# Patient Record
Sex: MALE | Race: WHITE | NOT HISPANIC OR LATINO | Employment: UNEMPLOYED | URBAN - METROPOLITAN AREA
[De-identification: names, ages, dates, MRNs, and addresses within clinical notes are randomized per-mention and may not be internally consistent; named-entity substitution may affect disease eponyms.]

---

## 2017-02-06 PROBLEM — E46 PROTEIN CALORIE MALNUTRITION (HCC): Chronic | Status: ACTIVE | Noted: 2017-02-06

## 2017-02-06 PROBLEM — R53.83 FATIGUE: Chronic | Status: ACTIVE | Noted: 2017-02-06

## 2017-02-06 PROBLEM — D64.9 ANEMIA: Status: ACTIVE | Noted: 2017-02-06

## 2017-02-06 PROBLEM — R00.0 TACHYCARDIA: Status: ACTIVE | Noted: 2017-02-06

## 2017-02-06 PROBLEM — R17 JAUNDICE: Status: ACTIVE | Noted: 2017-02-06

## 2017-02-06 PROBLEM — R74.01 TRANSAMINITIS: Status: ACTIVE | Noted: 2017-02-06

## 2017-02-06 PROBLEM — E11.9 TYPE 2 DIABETES MELLITUS (HCC): Status: ACTIVE | Noted: 2017-02-06

## 2017-02-06 PROBLEM — E83.42 HYPOMAGNESEMIA: Status: ACTIVE | Noted: 2017-02-06

## 2017-02-06 PROBLEM — D53.9 MACROCYTIC ANEMIA: Status: ACTIVE | Noted: 2017-02-06

## 2017-02-06 PROBLEM — E87.6 HYPOKALEMIA: Status: ACTIVE | Noted: 2017-02-06

## 2017-02-06 PROBLEM — E87.1 HYPONATREMIA: Status: ACTIVE | Noted: 2017-02-06

## 2017-02-06 PROBLEM — D50.9 IRON DEFICIENCY ANEMIA: Status: ACTIVE | Noted: 2017-02-06

## 2017-02-06 PROBLEM — F10.939 ALCOHOL WITHDRAWAL (HCC): Status: ACTIVE | Noted: 2017-02-06

## 2017-02-08 PROBLEM — D64.9 ANEMIA: Status: ACTIVE | Noted: 2017-02-08

## 2017-04-03 PROBLEM — T78.3XXA ANGIOEDEMA: Status: ACTIVE | Noted: 2017-04-03

## 2017-04-03 PROBLEM — F10.10 ALCOHOL ABUSE: Status: ACTIVE | Noted: 2017-04-03

## 2017-04-03 PROBLEM — K92.2 GIB (GASTROINTESTINAL BLEEDING): Status: ACTIVE | Noted: 2017-04-03

## 2017-04-03 PROBLEM — E87.20 LACTIC ACIDOSIS: Status: ACTIVE | Noted: 2017-04-03

## 2017-04-03 PROBLEM — E87.20 METABOLIC ACIDOSIS: Status: ACTIVE | Noted: 2017-04-03

## 2017-04-04 PROBLEM — R73.9 HYPERGLYCEMIA: Status: ACTIVE | Noted: 2017-04-04

## 2017-08-22 PROBLEM — I16.0 HYPERTENSIVE URGENCY: Status: ACTIVE | Noted: 2017-08-22

## 2018-10-16 PROBLEM — R74.8 ELEVATED LIPASE: Status: ACTIVE | Noted: 2017-02-06

## 2018-10-16 PROBLEM — E87.20 METABOLIC ACIDOSIS: Status: RESOLVED | Noted: 2017-04-03 | Resolved: 2018-10-16

## 2018-10-16 PROBLEM — T78.3XXA ANGIOEDEMA: Status: RESOLVED | Noted: 2017-04-03 | Resolved: 2018-10-16

## 2018-10-16 PROBLEM — E87.20 LACTIC ACIDOSIS: Status: RESOLVED | Noted: 2017-04-03 | Resolved: 2018-10-16

## 2018-10-16 PROBLEM — R53.83 FATIGUE: Chronic | Status: RESOLVED | Noted: 2017-02-06 | Resolved: 2018-10-16

## 2018-10-19 PROBLEM — R19.5 GUAIAC POSITIVE STOOLS: Status: ACTIVE | Noted: 2018-10-19

## 2018-10-30 PROBLEM — R00.0 TACHYCARDIA: Status: RESOLVED | Noted: 2017-02-06 | Resolved: 2018-10-30

## 2018-10-30 PROBLEM — E87.6 HYPOKALEMIA: Status: RESOLVED | Noted: 2017-02-06 | Resolved: 2018-10-30

## 2018-10-30 PROBLEM — R19.5 GUAIAC POSITIVE STOOLS: Status: RESOLVED | Noted: 2018-10-19 | Resolved: 2018-10-30

## 2018-10-30 PROBLEM — R74.8 ELEVATED LIPASE: Status: RESOLVED | Noted: 2017-02-06 | Resolved: 2018-10-30

## 2018-10-30 PROBLEM — R73.9 HYPERGLYCEMIA: Status: RESOLVED | Noted: 2017-04-04 | Resolved: 2018-10-30

## 2018-10-30 PROBLEM — F10.939 ALCOHOL WITHDRAWAL (HCC): Status: RESOLVED | Noted: 2017-02-06 | Resolved: 2018-10-30

## 2018-12-08 PROBLEM — R11.0 NAUSEA: Status: ACTIVE | Noted: 2018-12-08

## 2018-12-08 PROBLEM — L03.032 CELLULITIS OF GREAT TOE, LEFT: Status: ACTIVE | Noted: 2018-12-08

## 2018-12-08 PROBLEM — R10.9 ABDOMINAL PAIN: Status: ACTIVE | Noted: 2018-12-08

## 2018-12-08 PROBLEM — L03.032 PARONYCHIA OF GREAT TOE, LEFT: Status: ACTIVE | Noted: 2018-12-08

## 2018-12-08 PROBLEM — S92.424A NONDISP FX OF DISTAL PHALANX OF RIGHT GREAT TOE, INIT: Status: ACTIVE | Noted: 2018-12-08

## 2018-12-13 PROBLEM — S92.422K: Status: ACTIVE | Noted: 2018-12-13

## 2018-12-14 PROBLEM — R10.9 ABDOMINAL PAIN: Status: RESOLVED | Noted: 2018-12-08 | Resolved: 2018-12-14

## 2018-12-14 PROBLEM — R17 JAUNDICE: Status: RESOLVED | Noted: 2017-02-06 | Resolved: 2018-12-14

## 2018-12-14 PROBLEM — E83.42 HYPOMAGNESEMIA: Status: RESOLVED | Noted: 2017-02-06 | Resolved: 2018-12-14

## 2018-12-14 PROBLEM — R11.0 NAUSEA: Status: RESOLVED | Noted: 2018-12-08 | Resolved: 2018-12-14

## 2018-12-14 PROBLEM — K92.2 GIB (GASTROINTESTINAL BLEEDING): Status: RESOLVED | Noted: 2017-04-03 | Resolved: 2018-12-14

## 2018-12-14 PROBLEM — I16.0 HYPERTENSIVE URGENCY: Status: RESOLVED | Noted: 2017-08-22 | Resolved: 2018-12-14

## 2019-01-02 PROBLEM — M86.9 OSTEOMYELITIS OF TOE OF LEFT FOOT (HCC): Status: ACTIVE | Noted: 2019-01-02

## 2019-01-02 PROBLEM — L03.032 CELLULITIS OF GREAT TOE, LEFT: Chronic | Status: ACTIVE | Noted: 2018-12-08

## 2019-01-02 PROBLEM — S92.422K: Chronic | Status: ACTIVE | Noted: 2018-12-13

## 2019-01-02 PROBLEM — E11.9 TYPE 2 DIABETES MELLITUS (HCC): Chronic | Status: ACTIVE | Noted: 2017-02-06

## 2019-01-02 PROBLEM — L03.032 PARONYCHIA OF GREAT TOE OF LEFT FOOT: Chronic | Status: ACTIVE | Noted: 2018-12-08

## 2019-01-02 PROBLEM — S92.424A NONDISP FX OF DISTAL PHALANX OF RIGHT GREAT TOE, INIT: Chronic | Status: ACTIVE | Noted: 2018-12-08

## 2019-01-02 PROBLEM — E87.1 HYPONATREMIA: Chronic | Status: ACTIVE | Noted: 2017-02-06

## 2019-01-02 PROBLEM — D53.9 MACROCYTIC ANEMIA: Chronic | Status: ACTIVE | Noted: 2017-02-06

## 2019-01-03 PROBLEM — F10.11 ALCOHOL ABUSE, IN REMISSION: Status: ACTIVE | Noted: 2017-04-03

## 2019-01-17 PROBLEM — E10.42 DIABETIC POLYNEUROPATHY ASSOCIATED WITH TYPE 1 DIABETES MELLITUS (HCC): Status: ACTIVE | Noted: 2019-01-17

## 2019-04-08 PROBLEM — M79.675 TOE PAIN, LEFT: Status: ACTIVE | Noted: 2019-04-08

## 2019-04-08 PROBLEM — M79.674 TOE PAIN, RIGHT: Status: ACTIVE | Noted: 2019-04-08

## 2019-12-19 PROBLEM — R35.0 BENIGN PROSTATIC HYPERPLASIA WITH URINARY FREQUENCY: Status: ACTIVE | Noted: 2019-12-19

## 2019-12-19 PROBLEM — N40.1 BENIGN PROSTATIC HYPERPLASIA WITH URINARY FREQUENCY: Status: ACTIVE | Noted: 2019-12-19

## 2019-12-19 PROBLEM — R35.0 URINARY FREQUENCY: Status: ACTIVE | Noted: 2019-12-19

## 2020-04-06 PROBLEM — F41.0 PANIC ATTACK: Status: ACTIVE | Noted: 2020-04-06

## 2020-04-06 PROBLEM — R74.01 TRANSAMINITIS: Status: ACTIVE | Noted: 2020-04-06

## 2020-04-06 PROBLEM — B96.81 H PYLORI ULCER: Status: ACTIVE | Noted: 2020-04-06

## 2020-04-06 PROBLEM — G47.00 INSOMNIA: Status: ACTIVE | Noted: 2020-04-06

## 2020-04-06 PROBLEM — K27.9 H PYLORI ULCER: Status: ACTIVE | Noted: 2020-04-06

## 2020-04-06 PROBLEM — L03.032 CELLULITIS OF GREAT TOE, LEFT: Chronic | Status: RESOLVED | Noted: 2018-12-08 | Resolved: 2020-04-06

## 2020-04-06 PROBLEM — M79.674 TOE PAIN, RIGHT: Status: RESOLVED | Noted: 2019-04-08 | Resolved: 2020-04-06

## 2020-04-06 PROBLEM — S92.424A NONDISP FX OF DISTAL PHALANX OF RIGHT GREAT TOE, INIT: Chronic | Status: RESOLVED | Noted: 2018-12-08 | Resolved: 2020-04-06

## 2020-04-06 PROBLEM — M86.9 OSTEOMYELITIS OF TOE OF LEFT FOOT (HCC): Status: RESOLVED | Noted: 2019-01-02 | Resolved: 2020-04-06

## 2020-04-06 PROBLEM — M79.675 TOE PAIN, LEFT: Status: RESOLVED | Noted: 2019-04-08 | Resolved: 2020-04-06

## 2020-04-06 PROBLEM — L03.032 PARONYCHIA OF GREAT TOE OF LEFT FOOT: Chronic | Status: RESOLVED | Noted: 2018-12-08 | Resolved: 2020-04-06

## 2020-04-06 PROBLEM — E11.40 TYPE 2 DIABETES MELLITUS WITH DIABETIC NEUROPATHY, WITHOUT LONG-TERM CURRENT USE OF INSULIN (HCC): Status: ACTIVE | Noted: 2019-01-17

## 2020-04-06 PROBLEM — S92.422K: Chronic | Status: RESOLVED | Noted: 2018-12-13 | Resolved: 2020-04-06

## 2020-04-06 PROBLEM — E11.65 TYPE 2 DIABETES MELLITUS WITH HYPERGLYCEMIA, WITHOUT LONG-TERM CURRENT USE OF INSULIN (HCC): Status: ACTIVE | Noted: 2020-04-06

## 2020-04-09 PROBLEM — D53.9 MACROCYTIC ANEMIA: Chronic | Status: RESOLVED | Noted: 2017-02-06 | Resolved: 2020-04-09

## 2020-04-09 PROBLEM — R35.0 URINARY FREQUENCY: Status: RESOLVED | Noted: 2019-12-19 | Resolved: 2020-04-09

## 2020-04-09 PROBLEM — E11.9 TYPE 2 DIABETES MELLITUS (HCC): Chronic | Status: RESOLVED | Noted: 2017-02-06 | Resolved: 2020-04-09

## 2020-04-09 PROBLEM — D50.9 IRON DEFICIENCY ANEMIA: Status: RESOLVED | Noted: 2017-02-06 | Resolved: 2020-04-09

## 2020-04-09 PROBLEM — R41.82 ALTERED MENTAL STATE: Status: ACTIVE | Noted: 2020-04-09

## 2020-04-09 PROBLEM — E11.65 TYPE 2 DIABETES MELLITUS WITH HYPERGLYCEMIA, WITHOUT LONG-TERM CURRENT USE OF INSULIN (HCC): Status: RESOLVED | Noted: 2020-04-06 | Resolved: 2020-04-09

## 2020-04-10 PROBLEM — E87.1 HYPONATREMIA: Chronic | Status: RESOLVED | Noted: 2017-02-06 | Resolved: 2020-04-10

## 2020-04-10 PROBLEM — R19.7 DIARRHEA: Status: ACTIVE | Noted: 2020-04-10

## 2020-04-27 PROBLEM — I10 HYPERTENSION: Status: ACTIVE | Noted: 2020-04-27

## 2020-05-13 PROBLEM — R74.01 TRANSAMINITIS: Status: RESOLVED | Noted: 2020-04-06 | Resolved: 2020-05-13

## 2020-05-13 PROBLEM — F43.10 PTSD (POST-TRAUMATIC STRESS DISORDER): Chronic | Status: ACTIVE | Noted: 2020-05-13

## 2020-05-13 PROBLEM — R41.82 ALTERED MENTAL STATE: Status: RESOLVED | Noted: 2020-04-09 | Resolved: 2020-05-13

## 2020-05-24 PROBLEM — Z91.148 POOR COMPLIANCE WITH MEDICATION: Status: ACTIVE | Noted: 2020-05-24

## 2020-06-09 ENCOUNTER — APPOINTMENT (OUTPATIENT)
Dept: RADIOLOGY | Facility: MEDICAL CENTER | Age: 45
DRG: 963 | End: 2020-06-09
Attending: NEUROLOGICAL SURGERY
Payer: OTHER MISCELLANEOUS

## 2020-06-09 ENCOUNTER — HOSPITAL ENCOUNTER (OUTPATIENT)
Dept: RADIOLOGY | Facility: MEDICAL CENTER | Age: 45
End: 2020-06-09
Payer: COMMERCIAL

## 2020-06-09 ENCOUNTER — APPOINTMENT (OUTPATIENT)
Dept: RADIOLOGY | Facility: MEDICAL CENTER | Age: 45
DRG: 963 | End: 2020-06-09
Attending: EMERGENCY MEDICINE
Payer: OTHER MISCELLANEOUS

## 2020-06-09 ENCOUNTER — HOSPITAL ENCOUNTER (INPATIENT)
Facility: MEDICAL CENTER | Age: 45
LOS: 7 days | DRG: 963 | End: 2020-06-16
Attending: EMERGENCY MEDICINE | Admitting: SURGERY
Payer: OTHER MISCELLANEOUS

## 2020-06-09 DIAGNOSIS — F10.939 ALCOHOL WITHDRAWAL SYNDROME WITH COMPLICATION (HCC): ICD-10-CM

## 2020-06-09 DIAGNOSIS — R13.12 OROPHARYNGEAL DYSPHAGIA: ICD-10-CM

## 2020-06-09 DIAGNOSIS — S06.9X1A: ICD-10-CM

## 2020-06-09 DIAGNOSIS — M25.512 ACUTE PAIN OF LEFT SHOULDER: ICD-10-CM

## 2020-06-09 PROBLEM — F99 PSYCHIATRIC DISORDER: Status: ACTIVE | Noted: 2020-06-09

## 2020-06-09 PROBLEM — Z53.09 CONTRAINDICATION TO DEEP VEIN THROMBOSIS (DVT) PROPHYLAXIS: Status: ACTIVE | Noted: 2020-06-09

## 2020-06-09 PROBLEM — T14.90XA TRAUMA: Status: ACTIVE | Noted: 2020-06-09

## 2020-06-09 PROBLEM — S27.321A LEFT PULMONARY CONTUSION: Status: ACTIVE | Noted: 2020-06-09

## 2020-06-09 PROBLEM — E11.9 TYPE 2 DIABETES MELLITUS (HCC): Status: ACTIVE | Noted: 2020-06-09

## 2020-06-09 PROBLEM — J96.90 RESPIRATORY FAILURE FOLLOWING TRAUMA (HCC): Status: ACTIVE | Noted: 2020-06-09

## 2020-06-09 PROBLEM — S06.9XAA TRAUMATIC BRAIN INJURY, CLOSED (HCC): Status: ACTIVE | Noted: 2020-06-09

## 2020-06-09 PROBLEM — D50.9 IRON DEFICIENCY ANEMIA: Status: ACTIVE | Noted: 2020-06-09

## 2020-06-09 PROBLEM — Z11.9 SCREENING EXAMINATION FOR INFECTIOUS DISEASE: Status: ACTIVE | Noted: 2020-06-09

## 2020-06-09 LAB
ABO + RH BLD: NORMAL
ABO GROUP BLD: NORMAL
ACTION RANGE TRIGGERED IACRT: YES
ALBUMIN SERPL BCP-MCNC: 3.6 G/DL (ref 3.2–4.9)
ALBUMIN/GLOB SERPL: 1.1 G/DL
ALP SERPL-CCNC: 149 U/L (ref 30–99)
ALT SERPL-CCNC: 36 U/L (ref 2–50)
AMMONIA PLAS-SCNC: 24 UMOL/L (ref 11–45)
ANION GAP SERPL CALC-SCNC: 11 MMOL/L (ref 7–16)
ANISOCYTOSIS BLD QL SMEAR: ABNORMAL
APTT PPP: 29.5 SEC (ref 24.7–36)
AST SERPL-CCNC: 42 U/L (ref 12–45)
BASE EXCESS BLDA CALC-SCNC: -8 MMOL/L (ref -4–3)
BASOPHILS # BLD AUTO: 0 % (ref 0–1.8)
BASOPHILS # BLD: 0 K/UL (ref 0–0.12)
BILIRUB SERPL-MCNC: 0.2 MG/DL (ref 0.1–1.5)
BLD GP AB SCN SERPL QL: NORMAL
BODY TEMPERATURE: ABNORMAL DEGREES
BUN SERPL-MCNC: 23 MG/DL (ref 8–22)
CALCIUM SERPL-MCNC: 8.6 MG/DL (ref 8.5–10.5)
CFT BLD TEG: 4.3 MIN (ref 5–10)
CHLORIDE SERPL-SCNC: 103 MMOL/L (ref 96–112)
CLOT ANGLE BLD TEG: 66.3 DEGREES (ref 53–72)
CLOT LYSIS 30M P MA LENFR BLD TEG: 0.1 % (ref 0–8)
CO2 BLDA-SCNC: 20 MMOL/L (ref 20–33)
CO2 SERPL-SCNC: 18 MMOL/L (ref 20–33)
COVID ORDER STATUS COVID19: NORMAL
CREAT SERPL-MCNC: 0.87 MG/DL (ref 0.5–1.4)
CT.EXTRINSIC BLD ROTEM: 1.8 MIN (ref 1–3)
EOSINOPHIL # BLD AUTO: 0.44 K/UL (ref 0–0.51)
EOSINOPHIL NFR BLD: 7.8 % (ref 0–6.9)
ERYTHROCYTE [DISTWIDTH] IN BLOOD BY AUTOMATED COUNT: 63.3 FL (ref 35.9–50)
ERYTHROCYTE [DISTWIDTH] IN BLOOD BY AUTOMATED COUNT: 68.9 FL (ref 35.9–50)
ETHANOL BLD-MCNC: <10.1 MG/DL (ref 0–10.1)
GLOBULIN SER CALC-MCNC: 3.4 G/DL (ref 1.9–3.5)
GLUCOSE BLD-MCNC: 153 MG/DL (ref 65–99)
GLUCOSE SERPL-MCNC: 166 MG/DL (ref 65–99)
HCO3 BLDA-SCNC: 18.5 MMOL/L (ref 17–25)
HCT VFR BLD AUTO: 24.3 % (ref 42–52)
HCT VFR BLD AUTO: 27.2 % (ref 42–52)
HGB BLD-MCNC: 8 G/DL (ref 14–18)
HGB BLD-MCNC: 9.4 G/DL (ref 14–18)
HOROWITZ INDEX BLDA+IHG-RTO: 105 MM[HG]
INR PPP: 1.1 (ref 0.87–1.13)
INST. QUALIFIED PATIENT IIQPT: YES
LYMPHOCYTES # BLD AUTO: 1.9 K/UL (ref 1–4.8)
LYMPHOCYTES NFR BLD: 33.9 % (ref 22–41)
MACROCYTES BLD QL SMEAR: ABNORMAL
MANUAL DIFF BLD: NORMAL
MCF BLD TEG: 58.1 MM (ref 50–70)
MCH RBC QN AUTO: 33.7 PG (ref 27–33)
MCH RBC QN AUTO: 34.2 PG (ref 27–33)
MCHC RBC AUTO-ENTMCNC: 32.9 G/DL (ref 33.7–35.3)
MCHC RBC AUTO-ENTMCNC: 34.6 G/DL (ref 33.7–35.3)
MCV RBC AUTO: 103.8 FL (ref 81.4–97.8)
MCV RBC AUTO: 97.5 FL (ref 81.4–97.8)
MONOCYTES # BLD AUTO: 0.15 K/UL (ref 0–0.85)
MONOCYTES NFR BLD AUTO: 2.6 % (ref 0–13.4)
MORPHOLOGY BLD-IMP: NORMAL
NEUTROPHILS # BLD AUTO: 3.12 K/UL (ref 1.82–7.42)
NEUTROPHILS NFR BLD: 55.7 % (ref 44–72)
NRBC # BLD AUTO: 0 K/UL
NRBC BLD-RTO: 0 /100 WBC
O2/TOTAL GAS SETTING VFR VENT: 100 %
OVALOCYTES BLD QL SMEAR: NORMAL
PA AA BLD-ACNC: 0 %
PA ADP BLD-ACNC: 6.7 %
PCO2 BLDA: 40.2 MMHG (ref 26–37)
PCO2 TEMP ADJ BLDA: 38 MMHG (ref 26–37)
PH BLDA: 7.27 [PH] (ref 7.4–7.5)
PH TEMP ADJ BLDA: 7.29 [PH] (ref 7.4–7.5)
PLATELET # BLD AUTO: 129 K/UL (ref 164–446)
PLATELET # BLD AUTO: 141 K/UL (ref 164–446)
PLATELET BLD QL SMEAR: NORMAL
PMV BLD AUTO: 9.4 FL (ref 9–12.9)
PMV BLD AUTO: 9.9 FL (ref 9–12.9)
PO2 BLDA: 105 MMHG (ref 64–87)
PO2 TEMP ADJ BLDA: 98 MMHG (ref 64–87)
POTASSIUM SERPL-SCNC: 4.1 MMOL/L (ref 3.6–5.5)
PROT SERPL-MCNC: 7 G/DL (ref 6–8.2)
PROTHROMBIN TIME: 14.5 SEC (ref 12–14.6)
RBC # BLD AUTO: 2.34 M/UL (ref 4.7–6.1)
RBC # BLD AUTO: 2.79 M/UL (ref 4.7–6.1)
RBC BLD AUTO: PRESENT
RH BLD: NORMAL
SAO2 % BLDA: 97 % (ref 93–99)
SARS-COV-2 RNA RESP QL NAA+PROBE: NOTDETECTED
SODIUM SERPL-SCNC: 132 MMOL/L (ref 135–145)
SPECIMEN DRAWN FROM PATIENT: ABNORMAL
SPECIMEN SOURCE: NORMAL
TEG ALGORITHM TGALG: ABNORMAL
TRIGL SERPL-MCNC: 70 MG/DL (ref 0–149)
WBC # BLD AUTO: 5.5 K/UL (ref 4.8–10.8)
WBC # BLD AUTO: 5.6 K/UL (ref 4.8–10.8)

## 2020-06-09 PROCEDURE — 85576 BLOOD PLATELET AGGREGATION: CPT | Mod: 91

## 2020-06-09 PROCEDURE — 85007 BL SMEAR W/DIFF WBC COUNT: CPT

## 2020-06-09 PROCEDURE — 86850 RBC ANTIBODY SCREEN: CPT

## 2020-06-09 PROCEDURE — 86900 BLOOD TYPING SEROLOGIC ABO: CPT

## 2020-06-09 PROCEDURE — 700111 HCHG RX REV CODE 636 W/ 250 OVERRIDE (IP): Performed by: NURSE PRACTITIONER

## 2020-06-09 PROCEDURE — G0390 TRAUMA RESPONS W/HOSP CRITI: HCPCS

## 2020-06-09 PROCEDURE — 94002 VENT MGMT INPAT INIT DAY: CPT

## 2020-06-09 PROCEDURE — 70450 CT HEAD/BRAIN W/O DYE: CPT

## 2020-06-09 PROCEDURE — 93010 ELECTROCARDIOGRAM REPORT: CPT | Performed by: INTERNAL MEDICINE

## 2020-06-09 PROCEDURE — 85384 FIBRINOGEN ACTIVITY: CPT

## 2020-06-09 PROCEDURE — 71045 X-RAY EXAM CHEST 1 VIEW: CPT

## 2020-06-09 PROCEDURE — 96365 THER/PROPH/DIAG IV INF INIT: CPT

## 2020-06-09 PROCEDURE — 96376 TX/PRO/DX INJ SAME DRUG ADON: CPT

## 2020-06-09 PROCEDURE — C9803 HOPD COVID-19 SPEC COLLECT: HCPCS | Performed by: NURSE PRACTITIONER

## 2020-06-09 PROCEDURE — 31500 INSERT EMERGENCY AIRWAY: CPT

## 2020-06-09 PROCEDURE — 700102 HCHG RX REV CODE 250 W/ 637 OVERRIDE(OP): Performed by: NURSE PRACTITIONER

## 2020-06-09 PROCEDURE — 36600 WITHDRAWAL OF ARTERIAL BLOOD: CPT

## 2020-06-09 PROCEDURE — 85027 COMPLETE CBC AUTOMATED: CPT | Mod: 91

## 2020-06-09 PROCEDURE — 82803 BLOOD GASES ANY COMBINATION: CPT

## 2020-06-09 PROCEDURE — 96375 TX/PRO/DX INJ NEW DRUG ADDON: CPT

## 2020-06-09 PROCEDURE — 700111 HCHG RX REV CODE 636 W/ 250 OVERRIDE (IP): Performed by: EMERGENCY MEDICINE

## 2020-06-09 PROCEDURE — 99291 CRITICAL CARE FIRST HOUR: CPT

## 2020-06-09 PROCEDURE — 700101 HCHG RX REV CODE 250: Performed by: EMERGENCY MEDICINE

## 2020-06-09 PROCEDURE — 80053 COMPREHEN METABOLIC PANEL: CPT

## 2020-06-09 PROCEDURE — 86901 BLOOD TYPING SEROLOGIC RH(D): CPT

## 2020-06-09 PROCEDURE — 82962 GLUCOSE BLOOD TEST: CPT

## 2020-06-09 PROCEDURE — 5A1935Z RESPIRATORY VENTILATION, LESS THAN 24 CONSECUTIVE HOURS: ICD-10-PCS | Performed by: EMERGENCY MEDICINE

## 2020-06-09 PROCEDURE — 80307 DRUG TEST PRSMV CHEM ANLYZR: CPT

## 2020-06-09 PROCEDURE — 302214 INTUBATION BOX: Performed by: EMERGENCY MEDICINE

## 2020-06-09 PROCEDURE — 770022 HCHG ROOM/CARE - ICU (200)

## 2020-06-09 PROCEDURE — 84478 ASSAY OF TRIGLYCERIDES: CPT

## 2020-06-09 PROCEDURE — 0BH18EZ INSERTION OF ENDOTRACHEAL AIRWAY INTO TRACHEA, VIA NATURAL OR ARTIFICIAL OPENING ENDOSCOPIC: ICD-10-PCS | Performed by: EMERGENCY MEDICINE

## 2020-06-09 PROCEDURE — 93005 ELECTROCARDIOGRAM TRACING: CPT | Performed by: NURSE PRACTITIONER

## 2020-06-09 PROCEDURE — 85730 THROMBOPLASTIN TIME PARTIAL: CPT

## 2020-06-09 PROCEDURE — 85347 COAGULATION TIME ACTIVATED: CPT

## 2020-06-09 PROCEDURE — 72128 CT CHEST SPINE W/O DYE: CPT

## 2020-06-09 PROCEDURE — 700105 HCHG RX REV CODE 258: Performed by: NURSE PRACTITIONER

## 2020-06-09 PROCEDURE — U0004 COV-19 TEST NON-CDC HGH THRU: HCPCS

## 2020-06-09 PROCEDURE — 82140 ASSAY OF AMMONIA: CPT

## 2020-06-09 PROCEDURE — 72131 CT LUMBAR SPINE W/O DYE: CPT

## 2020-06-09 PROCEDURE — 85610 PROTHROMBIN TIME: CPT

## 2020-06-09 RX ORDER — HALOPERIDOL 5 MG/ML
INJECTION INTRAMUSCULAR
Status: DISPENSED
Start: 2020-06-09 | End: 2020-06-10

## 2020-06-09 RX ORDER — DEXTROSE MONOHYDRATE 25 G/50ML
50 INJECTION, SOLUTION INTRAVENOUS
Status: DISCONTINUED | OUTPATIENT
Start: 2020-06-09 | End: 2020-06-13

## 2020-06-09 RX ORDER — HALOPERIDOL 5 MG/ML
INJECTION INTRAMUSCULAR
Status: COMPLETED | OUTPATIENT
Start: 2020-06-09 | End: 2020-06-09

## 2020-06-09 RX ORDER — ENEMA 19; 7 G/133ML; G/133ML
1 ENEMA RECTAL
Status: DISCONTINUED | OUTPATIENT
Start: 2020-06-09 | End: 2020-06-16 | Stop reason: HOSPADM

## 2020-06-09 RX ORDER — DOCUSATE SODIUM 100 MG/1
100 CAPSULE, LIQUID FILLED ORAL 2 TIMES DAILY
Status: DISCONTINUED | OUTPATIENT
Start: 2020-06-09 | End: 2020-06-16 | Stop reason: HOSPADM

## 2020-06-09 RX ORDER — ETOMIDATE 2 MG/ML
20 INJECTION INTRAVENOUS ONCE
Status: COMPLETED | OUTPATIENT
Start: 2020-06-09 | End: 2020-06-09

## 2020-06-09 RX ORDER — MIDAZOLAM HYDROCHLORIDE 1 MG/ML
1-5 INJECTION INTRAMUSCULAR; INTRAVENOUS
Status: DISCONTINUED | OUTPATIENT
Start: 2020-06-09 | End: 2020-06-11

## 2020-06-09 RX ORDER — ROCURONIUM BROMIDE 10 MG/ML
100 INJECTION, SOLUTION INTRAVENOUS ONCE
Status: COMPLETED | OUTPATIENT
Start: 2020-06-09 | End: 2020-06-09

## 2020-06-09 RX ORDER — LORAZEPAM 2 MG/ML
1 INJECTION INTRAMUSCULAR ONCE
Status: COMPLETED | OUTPATIENT
Start: 2020-06-09 | End: 2020-06-09

## 2020-06-09 RX ORDER — BISACODYL 10 MG
10 SUPPOSITORY, RECTAL RECTAL
Status: DISCONTINUED | OUTPATIENT
Start: 2020-06-09 | End: 2020-06-16 | Stop reason: HOSPADM

## 2020-06-09 RX ORDER — POLYETHYLENE GLYCOL 3350 17 G/17G
1 POWDER, FOR SOLUTION ORAL 2 TIMES DAILY
Status: DISCONTINUED | OUTPATIENT
Start: 2020-06-09 | End: 2020-06-16 | Stop reason: HOSPADM

## 2020-06-09 RX ORDER — LORAZEPAM 2 MG/ML
INJECTION INTRAMUSCULAR
Status: COMPLETED | OUTPATIENT
Start: 2020-06-09 | End: 2020-06-09

## 2020-06-09 RX ORDER — ONDANSETRON 2 MG/ML
4 INJECTION INTRAMUSCULAR; INTRAVENOUS EVERY 4 HOURS PRN
Status: DISCONTINUED | OUTPATIENT
Start: 2020-06-09 | End: 2020-06-16 | Stop reason: HOSPADM

## 2020-06-09 RX ORDER — FAMOTIDINE 20 MG/1
20 TABLET, FILM COATED ORAL 2 TIMES DAILY
Status: DISCONTINUED | OUTPATIENT
Start: 2020-06-09 | End: 2020-06-10

## 2020-06-09 RX ORDER — AMOXICILLIN 250 MG
1 CAPSULE ORAL NIGHTLY
Status: DISCONTINUED | OUTPATIENT
Start: 2020-06-09 | End: 2020-06-12

## 2020-06-09 RX ORDER — SODIUM CHLORIDE 9 MG/ML
INJECTION, SOLUTION INTRAVENOUS CONTINUOUS
Status: DISCONTINUED | OUTPATIENT
Start: 2020-06-09 | End: 2020-06-13

## 2020-06-09 RX ORDER — HALOPERIDOL 5 MG/ML
5 INJECTION INTRAMUSCULAR ONCE
Status: COMPLETED | OUTPATIENT
Start: 2020-06-09 | End: 2020-06-09

## 2020-06-09 RX ORDER — AMOXICILLIN 250 MG
1 CAPSULE ORAL
Status: DISCONTINUED | OUTPATIENT
Start: 2020-06-09 | End: 2020-06-16 | Stop reason: HOSPADM

## 2020-06-09 RX ADMIN — ROCURONIUM BROMIDE 100 MG: 10 INJECTION, SOLUTION INTRAVENOUS at 14:45

## 2020-06-09 RX ADMIN — SODIUM CHLORIDE: 9 INJECTION, SOLUTION INTRAVENOUS at 15:40

## 2020-06-09 RX ADMIN — HALOPERIDOL LACTATE 5 MG: 5 INJECTION, SOLUTION INTRAMUSCULAR at 15:30

## 2020-06-09 RX ADMIN — PROPOFOL 40 MCG/KG/MIN: 10 INJECTION, EMULSION INTRAVENOUS at 20:45

## 2020-06-09 RX ADMIN — LORAZEPAM 1 MG: 2 INJECTION INTRAMUSCULAR; INTRAVENOUS at 15:30

## 2020-06-09 RX ADMIN — FAMOTIDINE 20 MG: 10 INJECTION, SOLUTION INTRAVENOUS at 18:41

## 2020-06-09 RX ADMIN — LEVETIRACETAM 500 MG: 100 INJECTION, SOLUTION INTRAVENOUS at 18:12

## 2020-06-09 RX ADMIN — PROPOFOL 5 MCG/KG/MIN: 10 INJECTION, EMULSION INTRAVENOUS at 14:50

## 2020-06-09 RX ADMIN — ETOMIDATE 20 MG: 2 INJECTION INTRAVENOUS at 14:45

## 2020-06-09 RX ADMIN — INSULIN HUMAN 1 UNITS: 100 INJECTION, SOLUTION PARENTERAL at 18:43

## 2020-06-09 RX ADMIN — HALOPERIDOL LACTATE 5 MG: 5 INJECTION, SOLUTION INTRAMUSCULAR at 14:15

## 2020-06-09 RX ADMIN — LORAZEPAM 2 MG: 2 INJECTION INTRAMUSCULAR; INTRAVENOUS at 14:11

## 2020-06-09 ASSESSMENT — FIBROSIS 4 INDEX
FIB4 SCORE: 2.44
FIB4 SCORE: 1.35
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
## 2020-06-09 NOTE — DISCHARGE PLANNING
Medical Social Work    LSW received call from pt's mom, Coleen ([phone]), who was asking for a medical update on her son. LSW provided support to mom. LSW took mom's information and LSW let mom know that her information would be given to the nurse. Mom agreeable.     LSW contacted ICU and left message for bedside RN to call pt's mom with a medical update when available to do so.

## 2020-06-09 NOTE — ASSESSMENT & PLAN NOTE
CT chest with ill-defined focal density measuring approximately 3 cm in diameter involving the lateral aspect of the left midlung involving the upper lobe. In the setting of trauma, this finding is suggestive of a lung contusion.  Previous admission CXR imaging on 6/7 with an ill-defined 15 mm nodular density lateral left midlung.  Aggressive pulmonary hygiene.  Outpatient follow up.
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
## 2020-06-09 NOTE — PROGRESS NOTES
"This RN took over patient care at 1600. Report received from Charge RN Poonam .    Pt on vent by ETT set at APV/CMV set at 30/450/8/100%.   OG tube secured at 25cm and set to low continuous suction with 100cc of brownish pink output.     Pupils PERRL 2, sluuggish.    +2 Pulses on BUE; +1 BLE    Lung sounds are clear over dim.     BS normoactive X 4.     NGUYEN 5/5 strength.     Bilateral wrist restraints in place.     20g left AC running prop at 60mcg/kg/imn  18g right FA running NS @ 125mL/hr.     100cc of clear yellow urine at 1600.     Pt tachycardic at this time.    /86   Pulse (!) 141   Temp 36.3 °C (97.3 °F) (Temporal)   Resp (!) 25   Ht 1.803 m (5' 11\")   Wt 81.3 kg (179 lb 3.7 oz)   SpO2 100%   BMI 25.00 kg/m²     "

## 2020-06-09 NOTE — ASSESSMENT & PLAN NOTE
Admission SARS-CoV-2 PCR testing negative. LOW RISK patient. Repeat SARS-CoV-2 testing not indicated. Isolation precautions de-escalated.

## 2020-06-09 NOTE — ASSESSMENT & PLAN NOTE
Referring facility imaging with right posterior parietal lobe subarachnoid hemorrhage. Subdural hematoma along the posterior falx, left aspect measuring 3mm.  Repeat head CT stable.  Non-operative management.  Post traumatic pharmacologic seizure prophylaxis for 1 week completed 6/16.  6/15 SLP for cognitive evaluation completed. Patient appears at baseline and does not appear to require any further acute or post-acute SLP services for cognition.  Ki Allen MD. Neurosurgeon. [facility].

## 2020-06-09 NOTE — PROGRESS NOTES
Pt arrived to [facility]:    Skin check with mariana RN:   L forehead/eye abrasion  L upper/posterior shoulder bruising and abrasions    See flowsheet for vitals.
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
## 2020-06-09 NOTE — ASSESSMENT & PLAN NOTE
Initial systemic anticoagulation contraindicated secondary to elevated bleeding risk.  RAP score 5.  6/11 Trauma screening bilateral lower extremity venous duplex negative for above knee DVT.  6/13 Chemical DVT prophylaxis (Lovenox) initiated.  Ambulate TID.

## 2020-06-09 NOTE — FLOWSHEET NOTE
06/09/20 1501   Events/Summary/Plan   Events/Summary/Plan Pt intubated with an 8.0 @ 25 b/l equal breath sounds color change fog in tubing. please see flowsheet for vent settings   Vital Signs   Pulse (!) 141   Respiration (!) 25   Pulse Oximetry 100 %   CO2 Monitoring   ETCO2 (mmHg) 0   Chest Exam   Work Of Breathing / Effort Vented   Breath Sounds   RUL Breath Sounds Clear   RML Breath Sounds Clear   RLL Breath Sounds Clear   JOSHUA Breath Sounds Clear   LLL Breath Sounds Clear   Airways   Airway LDA Airway   Airway Oral 8.0   Placement Date/Time: 06/09/20 1526   Airway Placement: Central  Style: Cuffed  Airway Location: Oral  Airway Size: 8.0   Site Assessment Intact   Airway Tube Secured [employer];Commercial securing device   Date Securing Device changed? 06/09/20   Date Securing Device to be changed (Q 7 days) 06/16/20   Secured At  (cm) 25   Difficult Intubation? No   Cuffless No   Suctioning Device Inline Catheter Replaced   Next Closed Suction Device Change Due  06/16/20   Oxygen   FiO2% 100 %   O2 Delivery Device Ventilator

## 2020-06-09 NOTE — ED NOTES
Med rec updated and complete. Allergies reviewed  Pt is unable to participate in an interview at this time.  Placed call to pts home pharmacy ([facility]) to confirm last filled and picked up. Some of the   Medications listed were recent new starts.    [facility]

## 2020-06-09 NOTE — ED NOTES
LE: 1430 while in CT pt continued to increase agitation, pt was given additional medication with no affect, pt brought back to ED trauma bay for intubation. Intubation successful, all staff members with PPE, tube placement confirmed at bs pt to CT where exam was completed. Pt has been tachycardiac during duration in ED he had an hypotensive episode that resolved and BP has been up. Pt to ICU report to receiving RN pt belongings with pt in room.

## 2020-06-09 NOTE — ASSESSMENT & PLAN NOTE
Chronic condition treated with Glucotrol and Glucophage XL.  Holding maintenance metformin for 48 hours following intravenous contrast administration.  Insulin sliding scale coverage.   6/13 Resumed maintenance medication.

## 2020-06-09 NOTE — ASSESSMENT & PLAN NOTE
Chart review with history of iron deficient anemia.  6/10 Iron studies completed and marginally low.  6/13 Oral iron initiate.  Transfuse 1 unit PRBC's for hemoglobin less than 7.

## 2020-06-10 ENCOUNTER — APPOINTMENT (OUTPATIENT)
Dept: RADIOLOGY | Facility: MEDICAL CENTER | Age: 45
DRG: 963 | End: 2020-06-10
Attending: NURSE PRACTITIONER
Payer: OTHER MISCELLANEOUS

## 2020-06-10 LAB
ALBUMIN SERPL BCP-MCNC: 3.3 G/DL (ref 3.2–4.9)
ALBUMIN/GLOB SERPL: 1 G/DL
ALP SERPL-CCNC: 113 U/L (ref 30–99)
ALT SERPL-CCNC: 35 U/L (ref 2–50)
ANION GAP SERPL CALC-SCNC: 15 MMOL/L (ref 7–16)
ANION GAP SERPL CALC-SCNC: 9 MMOL/L (ref 7–16)
AST SERPL-CCNC: 32 U/L (ref 12–45)
BILIRUB SERPL-MCNC: 0.4 MG/DL (ref 0.1–1.5)
BUN SERPL-MCNC: 11 MG/DL (ref 8–22)
BUN SERPL-MCNC: 9 MG/DL (ref 8–22)
CALCIUM SERPL-MCNC: 7.7 MG/DL (ref 8.5–10.5)
CALCIUM SERPL-MCNC: 8.4 MG/DL (ref 8.5–10.5)
CHLORIDE SERPL-SCNC: 102 MMOL/L (ref 96–112)
CHLORIDE SERPL-SCNC: 104 MMOL/L (ref 96–112)
CO2 SERPL-SCNC: 17 MMOL/L (ref 20–33)
CO2 SERPL-SCNC: 19 MMOL/L (ref 20–33)
CREAT SERPL-MCNC: 0.66 MG/DL (ref 0.5–1.4)
CREAT SERPL-MCNC: 0.7 MG/DL (ref 0.5–1.4)
EKG IMPRESSION: NORMAL
ERYTHROCYTE [DISTWIDTH] IN BLOOD BY AUTOMATED COUNT: 64.4 FL (ref 35.9–50)
GLOBULIN SER CALC-MCNC: 3.4 G/DL (ref 1.9–3.5)
GLUCOSE BLD-MCNC: 114 MG/DL (ref 65–99)
GLUCOSE BLD-MCNC: 131 MG/DL (ref 65–99)
GLUCOSE BLD-MCNC: 165 MG/DL (ref 65–99)
GLUCOSE SERPL-MCNC: 124 MG/DL (ref 65–99)
GLUCOSE SERPL-MCNC: 139 MG/DL (ref 65–99)
HCT VFR BLD AUTO: 29 % (ref 42–52)
HGB BLD-MCNC: 9.8 G/DL (ref 14–18)
HGB RETIC QN AUTO: 42.6 PG/CELL (ref 29–35)
IMM RETICS NFR: 10.9 % (ref 9.3–17.4)
IRON SATN MFR SERPL: 14 % (ref 15–55)
IRON SERPL-MCNC: 31 UG/DL (ref 50–180)
MCH RBC QN AUTO: 33.3 PG (ref 27–33)
MCHC RBC AUTO-ENTMCNC: 33.8 G/DL (ref 33.7–35.3)
MCV RBC AUTO: 98.6 FL (ref 81.4–97.8)
PLATELET # BLD AUTO: 145 K/UL (ref 164–446)
PMV BLD AUTO: 9.6 FL (ref 9–12.9)
POTASSIUM SERPL-SCNC: 3.4 MMOL/L (ref 3.6–5.5)
POTASSIUM SERPL-SCNC: 3.5 MMOL/L (ref 3.6–5.5)
PROT SERPL-MCNC: 6.7 G/DL (ref 6–8.2)
RBC # BLD AUTO: 2.94 M/UL (ref 4.7–6.1)
RETICS # AUTO: 0.05 M/UL (ref 0.04–0.06)
RETICS/RBC NFR: 1.7 % (ref 0.8–2.1)
SODIUM SERPL-SCNC: 130 MMOL/L (ref 135–145)
SODIUM SERPL-SCNC: 136 MMOL/L (ref 135–145)
TIBC SERPL-MCNC: 218 UG/DL (ref 250–450)
UIBC SERPL-MCNC: 187 UG/DL (ref 110–370)
WBC # BLD AUTO: 5.3 K/UL (ref 4.8–10.8)

## 2020-06-10 PROCEDURE — 85046 RETICYTE/HGB CONCENTRATE: CPT

## 2020-06-10 PROCEDURE — 83550 IRON BINDING TEST: CPT

## 2020-06-10 PROCEDURE — 83540 ASSAY OF IRON: CPT

## 2020-06-10 PROCEDURE — 99291 CRITICAL CARE FIRST HOUR: CPT | Performed by: SURGERY

## 2020-06-10 PROCEDURE — 51798 US URINE CAPACITY MEASURE: CPT

## 2020-06-10 PROCEDURE — 700111 HCHG RX REV CODE 636 W/ 250 OVERRIDE (IP): Performed by: SURGERY

## 2020-06-10 PROCEDURE — 700111 HCHG RX REV CODE 636 W/ 250 OVERRIDE (IP): Performed by: NURSE PRACTITIONER

## 2020-06-10 PROCEDURE — A9270 NON-COVERED ITEM OR SERVICE: HCPCS | Performed by: SURGERY

## 2020-06-10 PROCEDURE — 71045 X-RAY EXAM CHEST 1 VIEW: CPT

## 2020-06-10 PROCEDURE — 82962 GLUCOSE BLOOD TEST: CPT

## 2020-06-10 PROCEDURE — 700105 HCHG RX REV CODE 258: Performed by: NURSE PRACTITIONER

## 2020-06-10 PROCEDURE — 85027 COMPLETE CBC AUTOMATED: CPT | Mod: 91

## 2020-06-10 PROCEDURE — 80053 COMPREHEN METABOLIC PANEL: CPT

## 2020-06-10 PROCEDURE — 770022 HCHG ROOM/CARE - ICU (200)

## 2020-06-10 PROCEDURE — 94003 VENT MGMT INPAT SUBQ DAY: CPT

## 2020-06-10 PROCEDURE — 85007 BL SMEAR W/DIFF WBC COUNT: CPT

## 2020-06-10 PROCEDURE — 80048 BASIC METABOLIC PNL TOTAL CA: CPT

## 2020-06-10 PROCEDURE — 700102 HCHG RX REV CODE 250 W/ 637 OVERRIDE(OP): Performed by: SURGERY

## 2020-06-10 RX ORDER — LEVETIRACETAM 500 MG/1
500 TABLET ORAL 2 TIMES DAILY
Status: DISCONTINUED | OUTPATIENT
Start: 2020-06-10 | End: 2020-06-11

## 2020-06-10 RX ORDER — ACETAMINOPHEN 325 MG/1
650 TABLET ORAL EVERY 4 HOURS PRN
Status: DISCONTINUED | OUTPATIENT
Start: 2020-06-10 | End: 2020-06-16 | Stop reason: HOSPADM

## 2020-06-10 RX ORDER — POTASSIUM CHLORIDE 20 MEQ/1
40 TABLET, EXTENDED RELEASE ORAL ONCE
Status: COMPLETED | OUTPATIENT
Start: 2020-06-10 | End: 2020-06-10

## 2020-06-10 RX ADMIN — ACETAMINOPHEN 650 MG: 325 TABLET, FILM COATED ORAL at 21:48

## 2020-06-10 RX ADMIN — LEVETIRACETAM 500 MG: 500 TABLET ORAL at 17:25

## 2020-06-10 RX ADMIN — FENTANYL CITRATE 100 MCG: 50 INJECTION INTRAMUSCULAR; INTRAVENOUS at 22:02

## 2020-06-10 RX ADMIN — FAMOTIDINE 20 MG: 10 INJECTION, SOLUTION INTRAVENOUS at 05:47

## 2020-06-10 RX ADMIN — SODIUM CHLORIDE: 9 INJECTION, SOLUTION INTRAVENOUS at 08:59

## 2020-06-10 RX ADMIN — POTASSIUM CHLORIDE 40 MEQ: 1500 TABLET, EXTENDED RELEASE ORAL at 15:37

## 2020-06-10 RX ADMIN — INSULIN HUMAN 1 UNITS: 100 INJECTION, SOLUTION PARENTERAL at 17:28

## 2020-06-10 RX ADMIN — LEVETIRACETAM 500 MG: 100 INJECTION, SOLUTION INTRAVENOUS at 05:47

## 2020-06-10 RX ADMIN — ACETAMINOPHEN 650 MG: 325 TABLET, FILM COATED ORAL at 12:51

## 2020-06-10 ASSESSMENT — COPD QUESTIONNAIRES
COPD SCREENING SCORE: 3
HAVE YOU SMOKED AT LEAST 100 CIGARETTES IN YOUR ENTIRE LIFE: YES
DO YOU EVER COUGH UP ANY MUCUS OR PHLEGM?: YES, A FEW DAYS A WEEK OR MONTH
DURING THE PAST 4 WEEKS HOW MUCH DID YOU FEEL SHORT OF BREATH: NONE/LITTLE OF THE TIME

## 2020-06-10 ASSESSMENT — LIFESTYLE VARIABLES: EVER_SMOKED: YES
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
## 2020-06-10 NOTE — PROGRESS NOTES
Trauma Surgery SARS-CoV-2 Contact Precautions De-escalation Note    Admission SARS-CoV-2 PCR testing negative. LOW RISK patient after discussion with the patient's mother. Repeat SARS-CoV-2 testing not indicated.   Isolation precautions de-escalated.     ____________________________________     Nahum Beltran M.D.    DD: 6/9/2020  5:42 PM

## 2020-06-10 NOTE — CARE PLAN
Turn q2h to prevent skin breakdown.  Skin assessed.      Will continue to monitor pain throughout this shift. PRN meds to be given if needed     Monitor settings checked. Bed in low locked position. Bed alarm On. Safety check completed
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
## 2020-06-10 NOTE — PROGRESS NOTES
Dr. Beltran notified of the COVID test and that the screening questions were asked to her mother were all negative. Pt is low risk, MD to remove the patient from isolation.

## 2020-06-10 NOTE — CARE PLAN
Problem: Safety  Goal: Will remain free from injury  Note: Room near nursing station, bedalarm activated, restraints applied appropriately and alarms/drips verified to ensure pt safety.     Problem: Skin Integrity  Goal: Risk for impaired skin integrity will decrease  Note: Pt turned every 2hrs and pressure points floated using pillows.
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
## 2020-06-10 NOTE — CARE PLAN
Problem: Ventilation Defect:  Goal: Ability to achieve and maintain unassisted ventilation or tolerate decreased levels of ventilator support  Outcome: PROGRESSING SLOWER THAN EXPECTED      Ventilator Daily Summary    Vent Day #2    Ventilator settings changed this shift: ASV, 140%, +8, 40%    Weaning trials:    Respiratory Procedures:    Plan: Continue current ventilator settings and wean mechanical ventilation as tolerated per physician orders.

## 2020-06-10 NOTE — RESPIRATORY CARE
Extubation    Cuff leak noted yes  Stridor present no             Patient toleration well, no complications  RCP Complete? yes
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
## 2020-06-10 NOTE — PROGRESS NOTES
"TRAUMA TERTIARY SURVEY     Mental status adequate for full examination?: No    Spine cleared (radiologically and/or clinically): Yes    PHYSICAL EXAMINATION:  Vitals: /93   Pulse (!) 122   Temp (!) 35.7 °C (96.3 °F)   Resp (!) 24   Ht 1.803 m (5' 11\")   Wt 81.3 kg (179 lb 3.7 oz)   SpO2 99%   BMI 25.00 kg/m²   Constitutional:     General Appearance: appears stated age.  HEENT:     No significant external craniofacial trauma. sedated on ventilator. The extraocular muscles are intact bilaterally. vented, unable to access.. The nares and oropharynx are clear. The midface and jaw are stable. No malocclusion is evident.  Neck:    Normal range of motion.  Respiratory:   Inspection: Mechanically ventilated.   Palpation:  The chest is unable to access. The clavicles are non deformed bilaterally..   Auscultation: clear to auscultation.  Cardiovascular:   Auscultation: regular rate and rhythm.   Peripheral Pulses: Normal.   Abdomen:   Abdomen is soft, nontender, without organomegaly or masses.  Genitourinary:   (MALE):   Musculoskeletal:   The pelvis is stable.  No significant angulation, deformity, or soft tissue injury involving the upper and lower extremities. Normal range of motion.   Back:   The thoracolumbar spine was examined. Examination is remarkable for no significant tenderness, swelling, or deformity in the thoracolumbar region.  Skin:   The skin is warm and dry.  Neurologic:    Danni Coma Scale (GCS) 3T E1V1M1. Neurologic examination revealed unable to access.  Psychiatric:   The patient vented, unable to access..    IMAGING:  DX-CHEST-PORTABLE (1 VIEW)   Final Result      No acute cardiopulmonary abnormality.      CT-HEAD W/O   Final Result      1. Improved small left parafalcine and tentorial subdural hematoma.   2. Resolved small right parietal subarachnoid hemorrhage.   3. No new abnormality.      CT-LSPINE W/O PLUS RECONS   Final Result      No evidence of fracture of the lumbar spine.    "   CT-TSPINE W/O PLUS RECONS   Final Result      Chronic appearing compression fractures at T8 and T10. No definite acute fracture seen.      CT-HEAD W/O   Final Result      Small parafalcine hematoma on the left.      Blood along the tentorium, left greater than right.      Trace amount of right parietal subarachnoid blood.      Paranasal sinus disease as above described.      Findings discussed with Dr. Lerma.         DX-CHEST-LIMITED (1 VIEW)   Final Result      Endotracheal tube projects at the level of the clavicular heads.      No acute cardiopulmonary process is seen.         OUTSIDE IMAGES-DX CHEST   Final Result      OUTSIDE IMAGES-CT HEAD   Final Result      OUTSIDE IMAGES-CT CERVICAL SPINE   Final Result      OUTSIDE IMAGES-CT CHEST   Final Result      OUTSIDE IMAGES-CT ABDOMEN /PELVIS   Final Result        All current laboratory studies/radiology exams reviewed: Yes    Completed Consultations:  Dr. Allen - Neurosurgert     Pending Consultations:  Currently no further findings    Newly Identified Diagnoses and Injuries:  Currently with limited tertiary    TOTAL RAP SCORE:  RAP Score Total: 7      ETOH Screening     Reason for no ETOH Intervention: Intubated and Traumatic Brain Injury  ETOH Screening     Assessment complete date: 6/10/2020

## 2020-06-10 NOTE — PROGRESS NOTES
IV Iron Per Pharmacy Note    Patient Lean Body Weight:  75 kg  Reason for Iron Replacement: Iron Deficiency Anemia, Acute Blood Loss      Lab Results   Component Value Date/Time    WBC 5.3 06/10/2020 12:02 AM    RBC 2.94 (L) 06/10/2020 12:02 AM    HEMOGLOBIN 9.8 (L) 06/10/2020 12:02 AM    HEMATOCRIT 29.0 (L) 06/10/2020 12:02 AM    MCV 98.6 (H) 06/10/2020 12:02 AM    MCH 33.3 (H) 06/10/2020 12:02 AM    MCHC 33.8 06/10/2020 12:02 AM    MPV 9.6 06/10/2020 12:02 AM       Recent Labs     06/10/20  0723   IRON 31*         Recent Labs     06/10/20  0723   CREATININE 0.66          Assessment/Plan:  1. IV Iron Not Indicated.  2. Patient extubated today, would recommend oral iron supplementation given patient has a possible history of iron deficiency anemia.    Laura Tadeo, NikolasD

## 2020-06-11 ENCOUNTER — APPOINTMENT (OUTPATIENT)
Dept: RADIOLOGY | Facility: MEDICAL CENTER | Age: 45
DRG: 963 | End: 2020-06-11
Attending: NURSE PRACTITIONER
Payer: OTHER MISCELLANEOUS

## 2020-06-11 LAB
ANISOCYTOSIS BLD QL SMEAR: ABNORMAL
BASO STIPL BLD QL SMEAR: NORMAL
BASOPHILS # BLD AUTO: 0.9 % (ref 0–1.8)
BASOPHILS # BLD: 0.05 K/UL (ref 0–0.12)
EOSINOPHIL # BLD AUTO: 0.41 K/UL (ref 0–0.51)
EOSINOPHIL NFR BLD: 6.9 % (ref 0–6.9)
ERYTHROCYTE [DISTWIDTH] IN BLOOD BY AUTOMATED COUNT: 62.7 FL (ref 35.9–50)
GLUCOSE BLD-MCNC: 107 MG/DL (ref 65–99)
GLUCOSE BLD-MCNC: 110 MG/DL (ref 65–99)
GLUCOSE BLD-MCNC: 95 MG/DL (ref 65–99)
GLUCOSE BLD-MCNC: 97 MG/DL (ref 65–99)
HCT VFR BLD AUTO: 28.1 % (ref 42–52)
HGB BLD-MCNC: 9.5 G/DL (ref 14–18)
LYMPHOCYTES # BLD AUTO: 1.91 K/UL (ref 1–4.8)
LYMPHOCYTES NFR BLD: 31.9 % (ref 22–41)
MACROCYTES BLD QL SMEAR: ABNORMAL
MANUAL DIFF BLD: NORMAL
MCH RBC QN AUTO: 33.1 PG (ref 27–33)
MCHC RBC AUTO-ENTMCNC: 33.8 G/DL (ref 33.7–35.3)
MCV RBC AUTO: 97.9 FL (ref 81.4–97.8)
MONOCYTES # BLD AUTO: 0.31 K/UL (ref 0–0.85)
MONOCYTES NFR BLD AUTO: 5.2 % (ref 0–13.4)
MORPHOLOGY BLD-IMP: NORMAL
NEUTROPHILS # BLD AUTO: 3.31 K/UL (ref 1.82–7.42)
NEUTROPHILS NFR BLD: 55.2 % (ref 44–72)
NRBC # BLD AUTO: 0 K/UL
NRBC BLD-RTO: 0 /100 WBC
OVALOCYTES BLD QL SMEAR: NORMAL
PLATELET # BLD AUTO: 137 K/UL (ref 164–446)
PLATELET BLD QL SMEAR: NORMAL
PMV BLD AUTO: 9.5 FL (ref 9–12.9)
POIKILOCYTOSIS BLD QL SMEAR: NORMAL
POLYCHROMASIA BLD QL SMEAR: NORMAL
RBC # BLD AUTO: 2.87 M/UL (ref 4.7–6.1)
RBC BLD AUTO: PRESENT
WBC # BLD AUTO: 6 K/UL (ref 4.8–10.8)

## 2020-06-11 PROCEDURE — A9270 NON-COVERED ITEM OR SERVICE: HCPCS | Performed by: SURGERY

## 2020-06-11 PROCEDURE — 770022 HCHG ROOM/CARE - ICU (200)

## 2020-06-11 PROCEDURE — 700105 HCHG RX REV CODE 258: Performed by: SURGERY

## 2020-06-11 PROCEDURE — 99233 SBSQ HOSP IP/OBS HIGH 50: CPT | Performed by: SURGERY

## 2020-06-11 PROCEDURE — 93970 EXTREMITY STUDY: CPT | Mod: 26 | Performed by: INTERNAL MEDICINE

## 2020-06-11 PROCEDURE — 93970 EXTREMITY STUDY: CPT

## 2020-06-11 PROCEDURE — 92610 EVALUATE SWALLOWING FUNCTION: CPT

## 2020-06-11 PROCEDURE — 82962 GLUCOSE BLOOD TEST: CPT | Mod: 91

## 2020-06-11 PROCEDURE — 700105 HCHG RX REV CODE 258: Performed by: NURSE PRACTITIONER

## 2020-06-11 PROCEDURE — 700102 HCHG RX REV CODE 250 W/ 637 OVERRIDE(OP): Performed by: SURGERY

## 2020-06-11 PROCEDURE — 92612 ENDOSCOPY SWALLOW (FEES) VID: CPT

## 2020-06-11 PROCEDURE — 97165 OT EVAL LOW COMPLEX 30 MIN: CPT

## 2020-06-11 PROCEDURE — 700111 HCHG RX REV CODE 636 W/ 250 OVERRIDE (IP): Performed by: SURGERY

## 2020-06-11 PROCEDURE — 97161 PT EVAL LOW COMPLEX 20 MIN: CPT

## 2020-06-11 PROCEDURE — 71045 X-RAY EXAM CHEST 1 VIEW: CPT

## 2020-06-11 RX ORDER — OXYCODONE HYDROCHLORIDE 10 MG/1
10 TABLET ORAL
Status: DISCONTINUED | OUTPATIENT
Start: 2020-06-11 | End: 2020-06-13

## 2020-06-11 RX ORDER — POTASSIUM CHLORIDE 20 MEQ/1
40 TABLET, EXTENDED RELEASE ORAL ONCE
Status: COMPLETED | OUTPATIENT
Start: 2020-06-11 | End: 2020-06-11

## 2020-06-11 RX ORDER — OXYCODONE HYDROCHLORIDE 5 MG/1
5 TABLET ORAL
Status: DISCONTINUED | OUTPATIENT
Start: 2020-06-11 | End: 2020-06-13

## 2020-06-11 RX ADMIN — OXYCODONE HYDROCHLORIDE 10 MG: 10 TABLET ORAL at 21:45

## 2020-06-11 RX ADMIN — ACETAMINOPHEN 650 MG: 325 TABLET, FILM COATED ORAL at 21:45

## 2020-06-11 RX ADMIN — POTASSIUM CHLORIDE 40 MEQ: 1500 TABLET, EXTENDED RELEASE ORAL at 14:19

## 2020-06-11 RX ADMIN — SODIUM CHLORIDE: 9 INJECTION, SOLUTION INTRAVENOUS at 21:54

## 2020-06-11 RX ADMIN — SODIUM CHLORIDE 500 MG: 9 INJECTION, SOLUTION INTRAVENOUS at 09:17

## 2020-06-11 RX ADMIN — ACETAMINOPHEN 650 MG: 325 TABLET, FILM COATED ORAL at 17:04

## 2020-06-11 RX ADMIN — SODIUM CHLORIDE 500 MG: 9 INJECTION, SOLUTION INTRAVENOUS at 17:50

## 2020-06-11 RX ADMIN — SODIUM CHLORIDE: 9 INJECTION, SOLUTION INTRAVENOUS at 10:28

## 2020-06-11 RX ADMIN — FENTANYL CITRATE 50 MCG: 50 INJECTION INTRAMUSCULAR; INTRAVENOUS at 06:59

## 2020-06-11 RX ADMIN — OXYCODONE HYDROCHLORIDE 10 MG: 10 TABLET ORAL at 17:04

## 2020-06-11 ASSESSMENT — COGNITIVE AND FUNCTIONAL STATUS - GENERAL
DAILY ACTIVITIY SCORE: 24
SUGGESTED CMS G CODE MODIFIER DAILY ACTIVITY: CH
MOVING FROM LYING ON BACK TO SITTING ON SIDE OF FLAT BED: A LITTLE
CLIMB 3 TO 5 STEPS WITH RAILING: A LITTLE
SUGGESTED CMS G CODE MODIFIER MOBILITY: CK
WALKING IN HOSPITAL ROOM: A LITTLE
MOVING TO AND FROM BED TO CHAIR: A LITTLE
STANDING UP FROM CHAIR USING ARMS: A LITTLE
MOBILITY SCORE: 18
TURNING FROM BACK TO SIDE WHILE IN FLAT BAD: A LITTLE

## 2020-06-11 ASSESSMENT — ACTIVITIES OF DAILY LIVING (ADL): TOILETING: INDEPENDENT

## 2020-06-11 ASSESSMENT — GAIT ASSESSMENTS
DEVIATION: SHUFFLED GAIT
GAIT LEVEL OF ASSIST: MINIMAL ASSIST
DISTANCE (FEET): 200

## 2020-06-11 ASSESSMENT — FIBROSIS 4 INDEX: FIB4 SCORE: 1.78

## 2020-06-11 NOTE — PROGRESS NOTES
2 RN skin check completed with Ti.    Skin assessed under the following devices: BP cuff, SCDs, SpO2 probe.  Abrasions scattered throughout, facial, L shoulder, hands.  Red/discolored feet. Redness to L great toe bunyon.   Sacrum intact.    Interventions: q2h turns, mobility as tolerated

## 2020-06-11 NOTE — THERAPY
06/11/20 0827   Prior Living Situation   Housing / Facility 1 Story House   Steps Into Home 2   Steps In Home 0   Equipment Owned None   Lives with - Patient's Self Care Capacity Parents   Prior Level of Functional Mobility   Bed Mobility Independent   Transfer Status Independent   Ambulation Independent   Assistive Devices Used None   Stairs Independent   Gait Analysis   Gait Level Of Assist Minimal Assist   Assistive Device None   Distance (Feet) 200   Deviation Shuffled Gait   Skilled Intervention Verbal Cuing;Tactile Cuing   Bed Mobility    Supine to Sit Supervised   Sit to Supine Supervised   Skilled Intervention Verbal Cuing   Comments w/ siderails   Functional Mobility   Sit to Stand Minimal Assist   Bed, Chair, Wheelchair Transfer Minimal Assist   Skilled Intervention Verbal Cuing;Tactile Cuing   Patient / Family Goals    Patient / Family Goal #1 Home   Short Term Goals    Short Term Goal # 1 Supine to eob w/ spv, no rails, in 6 visits to initiate oob   Short Term Goal # 2 Sit to supine w/ spv in 6 visits no rails   Short Term Goal # 3 Sit to/from stand w/ spv in 6 visits to initiate gait   Short Term Goal # 4 AMbulate 150 ft w/ spv, no AD, in 6 visits to [facility] fxl indep   Short Term Goal # 5 Up/down 2 steps w/ spv in 6 visits to access his home   Anticipated Discharge Equipment   DC Equipment Unable To Determine At This Time
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
## 2020-06-11 NOTE — PROGRESS NOTES
0920:  Patient presented in interdisciplinary trauma rounds with Dr. Hedrick.  MD requesting FEES today.     1130: Message left for SLP.

## 2020-06-11 NOTE — PROGRESS NOTES
0730:  Up to ambulate with PT/OT.    0815:  Mari NP at bedside to assess patient.   SLP at bedside to evaluate.

## 2020-06-11 NOTE — CARE PLAN
Problem: Safety  Goal: Will remain free from injury  Outcome: PROGRESSING AS EXPECTED   Chair alarm in use.  Educated on call light use.     Problem: Urinary Elimination:  Goal: Ability to reestablish a normal urinary elimination pattern will improve  Outcome: PROGRESSING SLOWER THAN EXPECTED   Urinary retention overnight.  Gerardo replaced.
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
## 2020-06-12 ENCOUNTER — APPOINTMENT (OUTPATIENT)
Dept: RADIOLOGY | Facility: MEDICAL CENTER | Age: 45
DRG: 963 | End: 2020-06-12
Attending: NURSE PRACTITIONER
Payer: OTHER MISCELLANEOUS

## 2020-06-12 PROBLEM — M25.512 ACUTE PAIN OF LEFT SHOULDER: Status: ACTIVE | Noted: 2020-06-12

## 2020-06-12 LAB
GLUCOSE BLD-MCNC: 142 MG/DL (ref 65–99)
GLUCOSE BLD-MCNC: 144 MG/DL (ref 65–99)
GLUCOSE BLD-MCNC: 76 MG/DL (ref 65–99)
GLUCOSE BLD-MCNC: 88 MG/DL (ref 65–99)
GLUCOSE BLD-MCNC: 94 MG/DL (ref 65–99)

## 2020-06-12 PROCEDURE — 700105 HCHG RX REV CODE 258: Performed by: NURSE PRACTITIONER

## 2020-06-12 PROCEDURE — 82962 GLUCOSE BLOOD TEST: CPT

## 2020-06-12 PROCEDURE — 73030 X-RAY EXAM OF SHOULDER: CPT | Mod: LT

## 2020-06-12 PROCEDURE — 770006 HCHG ROOM/CARE - MED/SURG/GYN SEMI*

## 2020-06-12 PROCEDURE — 99232 SBSQ HOSP IP/OBS MODERATE 35: CPT | Performed by: SURGERY

## 2020-06-12 PROCEDURE — 700105 HCHG RX REV CODE 258: Performed by: SURGERY

## 2020-06-12 PROCEDURE — 700111 HCHG RX REV CODE 636 W/ 250 OVERRIDE (IP): Performed by: SURGERY

## 2020-06-12 PROCEDURE — 700102 HCHG RX REV CODE 250 W/ 637 OVERRIDE(OP): Performed by: SURGERY

## 2020-06-12 PROCEDURE — A9270 NON-COVERED ITEM OR SERVICE: HCPCS | Performed by: SURGERY

## 2020-06-12 PROCEDURE — 71045 X-RAY EXAM CHEST 1 VIEW: CPT

## 2020-06-12 PROCEDURE — 700111 HCHG RX REV CODE 636 W/ 250 OVERRIDE (IP): Performed by: NURSE PRACTITIONER

## 2020-06-12 PROCEDURE — 92526 ORAL FUNCTION THERAPY: CPT

## 2020-06-12 RX ORDER — AMITRIPTYLINE HYDROCHLORIDE 50 MG/1
50 TABLET, FILM COATED ORAL EVERY EVENING
Status: DISCONTINUED | OUTPATIENT
Start: 2020-06-12 | End: 2020-06-16 | Stop reason: HOSPADM

## 2020-06-12 RX ORDER — PAROXETINE HYDROCHLORIDE 20 MG/1
10 TABLET, FILM COATED ORAL DAILY
Status: DISCONTINUED | OUTPATIENT
Start: 2020-06-12 | End: 2020-06-16 | Stop reason: HOSPADM

## 2020-06-12 RX ADMIN — OXYCODONE 5 MG: 5 TABLET ORAL at 16:50

## 2020-06-12 RX ADMIN — ACETAMINOPHEN 650 MG: 325 TABLET, FILM COATED ORAL at 05:40

## 2020-06-12 RX ADMIN — SODIUM CHLORIDE 500 MG: 9 INJECTION, SOLUTION INTRAVENOUS at 16:50

## 2020-06-12 RX ADMIN — PAROXETINE HYDROCHLORIDE 10 MG: 20 TABLET, FILM COATED ORAL at 14:46

## 2020-06-12 RX ADMIN — SODIUM CHLORIDE 500 MG: 9 INJECTION, SOLUTION INTRAVENOUS at 05:09

## 2020-06-12 RX ADMIN — AMITRIPTYLINE HYDROCHLORIDE 50 MG: 50 TABLET, FILM COATED ORAL at 16:50

## 2020-06-12 RX ADMIN — OXYCODONE 5 MG: 5 TABLET ORAL at 05:40

## 2020-06-12 RX ADMIN — OXYCODONE HYDROCHLORIDE 10 MG: 10 TABLET ORAL at 23:02

## 2020-06-12 ASSESSMENT — PATIENT HEALTH QUESTIONNAIRE - PHQ9
1. LITTLE INTEREST OR PLEASURE IN DOING THINGS: NOT AT ALL
2. FEELING DOWN, DEPRESSED, IRRITABLE, OR HOPELESS: NOT AT ALL
SUM OF ALL RESPONSES TO PHQ9 QUESTIONS 1 AND 2: 0

## 2020-06-12 ASSESSMENT — ENCOUNTER SYMPTOMS
MYALGIAS: 1
BLURRED VISION: 0
SHORTNESS OF BREATH: 0
ABDOMINAL PAIN: 0
FEVER: 0

## 2020-06-12 ASSESSMENT — FIBROSIS 4 INDEX: FIB4 SCORE: 1.78

## 2020-06-12 NOTE — PROGRESS NOTES
FEES completed by SLP.  SLP Discussed results with Dr. Hedrick.  Per MD, okcinthia to give PO medications crushed and mixed with thickened liquids.  Encourage multiple swallows with each drink.   Repeat FEES in am.

## 2020-06-12 NOTE — ASSESSMENT & PLAN NOTE
6/12 Imaging negative for acute fracture, degenerative changes in the humeral head and findings of calcific tendinitis of the rotator cuff insertion.  Outpatient follow up for persistent pain.
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
## 2020-06-12 NOTE — CARE PLAN
Problem: Pain Management  Goal: Pain level will decrease to patient's comfort goal  Outcome: PROGRESSING AS EXPECTED  Intervention: Follow pain managment plan developed in collaboration with patient and Interdisciplinary Team  Note: Assessed for pain and medicated per the MAR     Problem: Urinary Elimination:  Goal: Ability to reestablish a normal urinary elimination pattern will improve  Outcome: PROGRESSING SLOWER THAN EXPECTED  Intervention: Evaluate need to continue indwelling urinary catheter  Note: Hong placed for retention. To be removed today. Will monitor for post hong void and urinary retention.
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
## 2020-06-12 NOTE — PROGRESS NOTES
2 RN skin check completed with JOHANA Sam    Skin assessed head to toe and under the following devices: EKG leads, BP cuff, SCD's, PIV's, hong cath, pulse ox,     Areas of note include:  Abrasions to left shoulder  Scabs to left knee    Protective measures include:  Pt positioning self

## 2020-06-12 NOTE — CARE PLAN
Problem: Venous Thromboembolism (VTW)/Deep Vein Thrombosis (DVT) Prevention:  Goal: Patient will participate in Venous Thrombosis (VTE)/Deep Vein Thrombosis (DVT)Prevention Measures  Outcome: PROGRESSING AS EXPECTED  Flowsheets  Taken 6/10/2020 0800 by Colt Curry R.N.  Risk Assessment Score: 3  VTE RISK: High  Taken 6/12/2020 0000 by Sae Mg R.N.  Mechanical Prophylaxis: SCDs, Sequential Compression Device  SCDs, Sequential Compression Device: On  Taken 6/11/2020 2000 by Sae Mg R.N.  Pharmacologic Prophylaxis Used: Contraindicated per MD  Note: Venous ultrasound of lower extremities negative. Pt wearing SCDs. SCDs applied appropriately. Skin check performed under SCDs.      Problem: Respiratory:  Goal: Respiratory status will improve  Outcome: PROGRESSING AS EXPECTED

## 2020-06-12 NOTE — THERAPY
"Speech Language Pathology  Daily Treatment     Patient Name: Enrique Rivera  Age:  44 y.o., Sex:  male  Medical Record #: [id number]  Today's Date: 6/12/2020     Precautions  Precautions: (P) Fall Risk, Swallow Precautions ( See Comments)  Comments: aphonic     Assessment    Swallow function was reassessed today after FEES yesterday, which indicated edema, copious secretions, poor airway protection, silent aspiration w/ purees. Today, pt's vocal quality has improved greatly, able to communicate w/ a true voice at the sentence level, though still hoarse. Pt was presented with ice chips (6), thins via tsp/cup (2 oz), mildly thick liquids via tsp/cup (3 oz), liquidized solids (4 oz), pureed solids (4 oz). Swallow trigger was timely. Hyolaryngeal excursion was palpated as reduced w/ pt swallowing 2x per bolus. Reflexive coughing occurred in 1/6 ice chips, 2x with cup sips of thins, x1 with pudding. Mild throat clearing noted with cup sips of mildly thick liquids. Recommend initiating a Liquidised Diet (LQ3) with Mildly Thick Liquids (MT2) and monitoring during meals to ensure safe PO intake. Pt should take small bites/sips, eat at a slow rate, swallow 2x per bolus and STOP with any overt s/sx of aspiration.     Plan    1. Liquidised Diet (LQ3) with Mildly Thick Liquids (MT2) and monitoring during meals to ensure safe PO intake.   2. Pt should take small bites/sips, eat at a slow rate, swallow 2x per bolus and STOP with any overt s/sx of aspiration.   3. Crush meds in applesauce.     Continue current treatment plan.    Discharge recommendations:  Recommend inpatient transitional care services for continued speech therapy services.        Subjective    \"I feel a lot better today.\"     Objective       06/12/20 1040   Dysphagia    Positioning / Behavior Modification Modulate Rate or Bite Size;Multiple Swallows;Self Monitoring   Other Treatments Swallow reassessment w/ ice, thins, mildly thicks, liquidized, pureed solids   Diet " / Liquid Recommendation Liquidised (3) - (Nectar Thick Full Liquid);Mildly Thick (2) - (Nectar Thick)   Recommended Route of Medication Administration   Medication Administration  Crush all Medications in Puree   Short Term Goals   Short Term Goal # 1 Patient will consume PO trials with SLP only with no overt s/sx of aspiration.   Goal Outcome # 1 Goal met, new goal added   Short Term Goal # 1 B  Patient will consume meals of Liquidized solids/Mildly Thick liquids with no s/sx of aspiration given monitoring during meals.

## 2020-06-13 ENCOUNTER — APPOINTMENT (OUTPATIENT)
Dept: RADIOLOGY | Facility: MEDICAL CENTER | Age: 45
DRG: 963 | End: 2020-06-13
Attending: NURSE PRACTITIONER
Payer: OTHER MISCELLANEOUS

## 2020-06-13 PROBLEM — Z78.9 NO CONTRAINDICATION TO DEEP VEIN THROMBOSIS (DVT) PROPHYLAXIS: Status: ACTIVE | Noted: 2020-06-09

## 2020-06-13 PROBLEM — R13.12 OROPHARYNGEAL DYSPHAGIA: Status: ACTIVE | Noted: 2020-06-13

## 2020-06-13 PROBLEM — J96.90 RESPIRATORY FAILURE FOLLOWING TRAUMA (HCC): Status: RESOLVED | Noted: 2020-06-09 | Resolved: 2020-06-13

## 2020-06-13 PROBLEM — R93.89 ABNORMAL CT OF THE CHEST: Status: ACTIVE | Noted: 2020-06-09

## 2020-06-13 LAB
GLUCOSE BLD-MCNC: 111 MG/DL (ref 65–99)
GLUCOSE BLD-MCNC: 138 MG/DL (ref 65–99)
GLUCOSE BLD-MCNC: 78 MG/DL (ref 65–99)
GLUCOSE BLD-MCNC: 91 MG/DL (ref 65–99)

## 2020-06-13 PROCEDURE — 770006 HCHG ROOM/CARE - MED/SURG/GYN SEMI*

## 2020-06-13 PROCEDURE — 82962 GLUCOSE BLOOD TEST: CPT | Mod: 91

## 2020-06-13 PROCEDURE — A9270 NON-COVERED ITEM OR SERVICE: HCPCS | Performed by: NURSE PRACTITIONER

## 2020-06-13 PROCEDURE — 700102 HCHG RX REV CODE 250 W/ 637 OVERRIDE(OP): Performed by: NURSE PRACTITIONER

## 2020-06-13 PROCEDURE — A9270 NON-COVERED ITEM OR SERVICE: HCPCS | Performed by: SURGERY

## 2020-06-13 PROCEDURE — 97530 THERAPEUTIC ACTIVITIES: CPT | Mod: CQ

## 2020-06-13 PROCEDURE — 700111 HCHG RX REV CODE 636 W/ 250 OVERRIDE (IP): Performed by: NURSE PRACTITIONER

## 2020-06-13 PROCEDURE — 700102 HCHG RX REV CODE 250 W/ 637 OVERRIDE(OP): Performed by: SURGERY

## 2020-06-13 PROCEDURE — 97116 GAIT TRAINING THERAPY: CPT | Mod: CQ

## 2020-06-13 PROCEDURE — 700105 HCHG RX REV CODE 258: Performed by: NURSE PRACTITIONER

## 2020-06-13 PROCEDURE — 71045 X-RAY EXAM CHEST 1 VIEW: CPT

## 2020-06-13 RX ORDER — FERROUS GLUCONATE 324(38)MG
324 TABLET ORAL 2 TIMES DAILY WITH MEALS
Status: DISCONTINUED | OUTPATIENT
Start: 2020-06-13 | End: 2020-06-16 | Stop reason: HOSPADM

## 2020-06-13 RX ORDER — GLIPIZIDE 5 MG/1
5 TABLET ORAL DAILY
Status: DISCONTINUED | OUTPATIENT
Start: 2020-06-13 | End: 2020-06-16 | Stop reason: HOSPADM

## 2020-06-13 RX ORDER — LEVETIRACETAM 500 MG/1
500 TABLET ORAL EVERY 12 HOURS
Status: DISPENSED | OUTPATIENT
Start: 2020-06-13 | End: 2020-06-16

## 2020-06-13 RX ORDER — METFORMIN HYDROCHLORIDE 750 MG/1
750 TABLET, EXTENDED RELEASE ORAL 2 TIMES DAILY
Status: DISCONTINUED | OUTPATIENT
Start: 2020-06-13 | End: 2020-06-16 | Stop reason: HOSPADM

## 2020-06-13 RX ORDER — OXYCODONE HYDROCHLORIDE 5 MG/1
5 TABLET ORAL EVERY 4 HOURS PRN
Status: DISCONTINUED | OUTPATIENT
Start: 2020-06-13 | End: 2020-06-15

## 2020-06-13 RX ORDER — DEXTROSE MONOHYDRATE 25 G/50ML
50 INJECTION, SOLUTION INTRAVENOUS
Status: DISCONTINUED | OUTPATIENT
Start: 2020-06-13 | End: 2020-06-14

## 2020-06-13 RX ORDER — ECHINACEA PURPUREA EXTRACT 125 MG
2 TABLET ORAL
Status: DISCONTINUED | OUTPATIENT
Start: 2020-06-13 | End: 2020-06-16 | Stop reason: HOSPADM

## 2020-06-13 RX ADMIN — ACETAMINOPHEN 650 MG: 325 TABLET, FILM COATED ORAL at 20:37

## 2020-06-13 RX ADMIN — OXYCODONE 5 MG: 5 TABLET ORAL at 20:38

## 2020-06-13 RX ADMIN — GLIPIZIDE 5 MG: 5 TABLET ORAL at 11:29

## 2020-06-13 RX ADMIN — FERROUS GLUCONATE 324 MG: 324 TABLET ORAL at 16:45

## 2020-06-13 RX ADMIN — METFORMIN HYDROCHLORIDE 750 MG: 750 TABLET, EXTENDED RELEASE ORAL at 11:29

## 2020-06-13 RX ADMIN — FERROUS GLUCONATE 324 MG: 324 TABLET ORAL at 11:26

## 2020-06-13 RX ADMIN — OXYCODONE HYDROCHLORIDE 10 MG: 10 TABLET ORAL at 08:09

## 2020-06-13 RX ADMIN — ENOXAPARIN SODIUM 30 MG: 30 INJECTION SUBCUTANEOUS at 11:26

## 2020-06-13 RX ADMIN — PAROXETINE HYDROCHLORIDE 10 MG: 20 TABLET, FILM COATED ORAL at 04:28

## 2020-06-13 RX ADMIN — SODIUM CHLORIDE 500 MG: 9 INJECTION, SOLUTION INTRAVENOUS at 05:07

## 2020-06-13 RX ADMIN — ENOXAPARIN SODIUM 30 MG: 30 INJECTION SUBCUTANEOUS at 16:45

## 2020-06-13 RX ADMIN — LEVETIRACETAM 500 MG: 500 TABLET ORAL at 16:45

## 2020-06-13 RX ADMIN — OXYCODONE 5 MG: 5 TABLET ORAL at 14:18

## 2020-06-13 RX ADMIN — AMITRIPTYLINE HYDROCHLORIDE 50 MG: 50 TABLET, FILM COATED ORAL at 16:44

## 2020-06-13 RX ADMIN — METFORMIN HYDROCHLORIDE 750 MG: 750 TABLET, EXTENDED RELEASE ORAL at 21:15

## 2020-06-13 RX ADMIN — OXYCODONE HYDROCHLORIDE 10 MG: 10 TABLET ORAL at 04:28

## 2020-06-13 ASSESSMENT — ENCOUNTER SYMPTOMS
CHILLS: 0
CONSTIPATION: 0
ABDOMINAL PAIN: 0
BLURRED VISION: 0
NECK PAIN: 0
SPEECH CHANGE: 0
NAUSEA: 0
FOCAL WEAKNESS: 0
TINGLING: 0
HEADACHES: 0
MYALGIAS: 0
DIZZINESS: 0
SENSORY CHANGE: 0
SHORTNESS OF BREATH: 0
DOUBLE VISION: 0
VOMITING: 0
BACK PAIN: 0
FEVER: 0

## 2020-06-13 ASSESSMENT — COGNITIVE AND FUNCTIONAL STATUS - GENERAL
STANDING UP FROM CHAIR USING ARMS: A LITTLE
CLIMB 3 TO 5 STEPS WITH RAILING: A LITTLE
SUGGESTED CMS G CODE MODIFIER MOBILITY: CK
MOVING FROM LYING ON BACK TO SITTING ON SIDE OF FLAT BED: A LITTLE
MOBILITY SCORE: 18
TURNING FROM BACK TO SIDE WHILE IN FLAT BAD: A LITTLE
WALKING IN HOSPITAL ROOM: A LITTLE
MOVING TO AND FROM BED TO CHAIR: A LITTLE

## 2020-06-13 ASSESSMENT — FIBROSIS 4 INDEX: FIB4 SCORE: 1.78

## 2020-06-13 ASSESSMENT — GAIT ASSESSMENTS
GAIT LEVEL OF ASSIST: MINIMAL ASSIST
DISTANCE (FEET): 400

## 2020-06-13 NOTE — CARE PLAN
Problem: Communication  Goal: The ability to communicate needs accurately and effectively will improve  Outcome: PROGRESSING AS EXPECTED  Note: POC discussed with patient. Patient is A&Ox4.      Problem: Safety  Goal: Will remain free from injury  Outcome: PROGRESSING AS EXPECTED  Note: Safety education given to patient. Bed alarm on.      Problem: Pain Management  Goal: Pain level will decrease to patient's comfort goal  Outcome: PROGRESSING SLOWER THAN EXPECTED  Note: Pain assessed, medication given per MAR

## 2020-06-13 NOTE — CARE PLAN
Problem: Communication  Goal: The ability to communicate needs accurately and effectively will improve  Outcome: PROGRESSING AS EXPECTED  Intervention: [place] patient and significant other/support system to call light to alert staff of needs  Note: Pt Aox4, will call staff for help, agrees with plan of care     Problem: Safety  Goal: Will remain free from injury  Outcome: PROGRESSING AS EXPECTED  Goal: Will remain free from falls  Outcome: PROGRESSING AS EXPECTED     Problem: Infection  Goal: Will remain free from infection  Outcome: PROGRESSING AS EXPECTED     Problem: Venous Thromboembolism (VTW)/Deep Vein Thrombosis (DVT) Prevention:  Goal: Patient will participate in Venous Thrombosis (VTE)/Deep Vein Thrombosis (DVT)Prevention Measures  Outcome: PROGRESSING AS EXPECTED     Problem: Bowel/Gastric:  Goal: Normal bowel function is maintained or improved  Outcome: PROGRESSING AS EXPECTED  Goal: Will not experience complications related to bowel motility  Outcome: PROGRESSING AS EXPECTED     Problem: Knowledge Deficit  Goal: Knowledge of disease process/condition, treatment plan, diagnostic tests, and medications will improve  Outcome: PROGRESSING AS EXPECTED  Goal: Knowledge of the prescribed therapeutic regimen will improve  Outcome: PROGRESSING AS EXPECTED     Problem: Discharge Barriers/Planning  Goal: Patient's continuum of care needs will be met  Outcome: PROGRESSING AS EXPECTED     Problem: Skin Integrity  Goal: Risk for impaired skin integrity will decrease  Outcome: PROGRESSING AS EXPECTED     Problem: Respiratory:  Goal: Respiratory status will improve  Outcome: PROGRESSING AS EXPECTED     Problem: Psychosocial Needs:  Goal: Level of anxiety will decrease  Outcome: PROGRESSING AS EXPECTED     Problem: Urinary Elimination:  Goal: Ability to reestablish a normal urinary elimination pattern will improve  Outcome: PROGRESSING AS EXPECTED     Problem: Pain Management  Goal: Pain level will decrease to  patient's comfort goal  Outcome: PROGRESSING AS EXPECTED
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
## 2020-06-13 NOTE — ASSESSMENT & PLAN NOTE
6/11 Failed swallow eval.  6/12 Initiated Liquidised Diet (LQ3) with Mildly Thick Liquids (MT2) and monitoring during meals to ensure safe PO intake. Small bites/sips, eat at a slow rate, swallow 2x per bolus and STOP with any overt s/sx of aspiration. Crush meds in applesauce.  6/14 SLP for therapy. Continue LQ3MT2 diet.  6/15 Upgraded to SB6/MT2 diet (soft/bite-sized/nectar-thick).  6/16 Plan for repeat swallow eval and advancement to regular/thin liquid diet.  SLP following.

## 2020-06-13 NOTE — THERAPY
Physical Therapy   Daily Treatment     Patient Name: Enrique Rivera  Age:  44 y.o., Sex:  male  Medical Record #: [id number]  Today's Date: 6/13/2020     Precautions: Fall Risk    Assessment    Pt was pleasant and agreeable to therapy session. He completed bed mobility with features and spv. Some decreased safety awareness regarding balance but no overt LOB during standing dynamic balance. He increased gait distance with Hans for balance. Two LOB requiring assist, pt reporting occasionally he loses his balance at baseline. He completed 3 steps with handrail and hands on for safety. Good carryover of sequencing with stair training. Encouraged to continue to ambulate with nursing staff.     Plan    Continue current treatment plan.    Discharge recommendations: Anticipate that the patient will have no further physical therapy needs after discharge from the hospital.         06/13/20 1515   Gait Analysis   Gait Level Of Assist Minimal Assist   Assistive Device None   Distance (Feet) 400   # of Times Distance was Traveled 1   # of Stairs Climbed 3   Level of Assist with Stairs Minimal Assist   Skilled Intervention Verbal Cuing;Sequencing   Comments Two LOB during gait, Hans for balance. Short shuffling steps with occassional nbos   Bed Mobility    Supine to Sit Supervised   Sit to Supine Supervised   Scooting Supervised   Comments with hob slightly elevated    Functional Mobility   Sit to Stand Supervised   Bed, Chair, Wheelchair Transfer Supervised   Short Term Goals    Short Term Goal # 1 Supine to eob w/ spv, no rails, in 6 visits to initiate oob   Goal Outcome # 1 Progressing as expected   Short Term Goal # 2 Sit to supine w/ spv in 6 visits no rails   Goal Outcome # 2 Progressing as expected   Short Term Goal # 3 Sit to/from stand w/ spv in 6 visits to initiate gait   Goal Outcome # 3 Goal met   Short Term Goal # 4 AMbulate 150 ft w/ spv, no AD, in 6 visits to imrove fxl indep   Goal Outcome # 4 Progressing as expected    Short Term Goal # 5 Up/down 2 steps w/ spv in 6 visits to access his home   Goal Outcome # 5 Progressing as expected
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
## 2020-06-14 LAB — GLUCOSE BLD-MCNC: 105 MG/DL (ref 65–99)

## 2020-06-14 PROCEDURE — 82962 GLUCOSE BLOOD TEST: CPT

## 2020-06-14 PROCEDURE — A9270 NON-COVERED ITEM OR SERVICE: HCPCS | Performed by: NURSE PRACTITIONER

## 2020-06-14 PROCEDURE — 770006 HCHG ROOM/CARE - MED/SURG/GYN SEMI*

## 2020-06-14 PROCEDURE — 700101 HCHG RX REV CODE 250: Performed by: NURSE PRACTITIONER

## 2020-06-14 PROCEDURE — 700102 HCHG RX REV CODE 250 W/ 637 OVERRIDE(OP): Performed by: SURGERY

## 2020-06-14 PROCEDURE — 700102 HCHG RX REV CODE 250 W/ 637 OVERRIDE(OP): Performed by: NURSE PRACTITIONER

## 2020-06-14 PROCEDURE — 92526 ORAL FUNCTION THERAPY: CPT

## 2020-06-14 PROCEDURE — A9270 NON-COVERED ITEM OR SERVICE: HCPCS | Performed by: SURGERY

## 2020-06-14 PROCEDURE — 700111 HCHG RX REV CODE 636 W/ 250 OVERRIDE (IP): Performed by: NURSE PRACTITIONER

## 2020-06-14 PROCEDURE — 700112 HCHG RX REV CODE 229: Performed by: NURSE PRACTITIONER

## 2020-06-14 RX ORDER — LIDOCAINE 50 MG/G
1 PATCH TOPICAL EVERY 24 HOURS
Status: DISCONTINUED | OUTPATIENT
Start: 2020-06-14 | End: 2020-06-16 | Stop reason: HOSPADM

## 2020-06-14 RX ADMIN — LEVETIRACETAM 500 MG: 500 TABLET ORAL at 04:35

## 2020-06-14 RX ADMIN — LEVETIRACETAM 500 MG: 500 TABLET ORAL at 17:02

## 2020-06-14 RX ADMIN — AMITRIPTYLINE HYDROCHLORIDE 50 MG: 50 TABLET, FILM COATED ORAL at 17:02

## 2020-06-14 RX ADMIN — ACETAMINOPHEN 650 MG: 325 TABLET, FILM COATED ORAL at 21:29

## 2020-06-14 RX ADMIN — OXYCODONE 5 MG: 5 TABLET ORAL at 09:35

## 2020-06-14 RX ADMIN — LIDOCAINE 1 PATCH: 50 PATCH TOPICAL at 09:35

## 2020-06-14 RX ADMIN — METFORMIN HYDROCHLORIDE 750 MG: 750 TABLET, EXTENDED RELEASE ORAL at 11:26

## 2020-06-14 RX ADMIN — ACETAMINOPHEN 650 MG: 325 TABLET, FILM COATED ORAL at 00:43

## 2020-06-14 RX ADMIN — PAROXETINE HYDROCHLORIDE 10 MG: 20 TABLET, FILM COATED ORAL at 04:35

## 2020-06-14 RX ADMIN — DOCUSATE SODIUM 100 MG: 100 CAPSULE, LIQUID FILLED ORAL at 04:34

## 2020-06-14 RX ADMIN — METFORMIN HYDROCHLORIDE 750 MG: 750 TABLET, EXTENDED RELEASE ORAL at 21:29

## 2020-06-14 RX ADMIN — FERROUS GLUCONATE 324 MG: 324 TABLET ORAL at 17:40

## 2020-06-14 RX ADMIN — GLIPIZIDE 5 MG: 5 TABLET ORAL at 04:34

## 2020-06-14 RX ADMIN — ENOXAPARIN SODIUM 30 MG: 30 INJECTION SUBCUTANEOUS at 04:35

## 2020-06-14 RX ADMIN — OXYCODONE 5 MG: 5 TABLET ORAL at 04:35

## 2020-06-14 RX ADMIN — OXYCODONE 5 MG: 5 TABLET ORAL at 17:02

## 2020-06-14 RX ADMIN — OXYCODONE 5 MG: 5 TABLET ORAL at 21:29

## 2020-06-14 RX ADMIN — ACETAMINOPHEN 650 MG: 325 TABLET, FILM COATED ORAL at 04:35

## 2020-06-14 RX ADMIN — FERROUS GLUCONATE 324 MG: 324 TABLET ORAL at 07:42

## 2020-06-14 RX ADMIN — ENOXAPARIN SODIUM 30 MG: 30 INJECTION SUBCUTANEOUS at 17:02

## 2020-06-14 RX ADMIN — MAGNESIUM HYDROXIDE 30 ML: 400 SUSPENSION ORAL at 04:35

## 2020-06-14 RX ADMIN — OXYCODONE 5 MG: 5 TABLET ORAL at 00:43

## 2020-06-14 RX ADMIN — POLYETHYLENE GLYCOL 3350 1 PACKET: 17 POWDER, FOR SOLUTION ORAL at 04:35

## 2020-06-14 ASSESSMENT — ENCOUNTER SYMPTOMS
HEADACHES: 0
CHILLS: 0
TINGLING: 0
SENSORY CHANGE: 0
DOUBLE VISION: 0
ABDOMINAL PAIN: 0
SPEECH CHANGE: 0
NAUSEA: 0
BLURRED VISION: 0
SHORTNESS OF BREATH: 0
FOCAL WEAKNESS: 0
NECK PAIN: 0
DIZZINESS: 0
BACK PAIN: 0
FEVER: 0
CONSTIPATION: 0
VOMITING: 0
MYALGIAS: 1

## 2020-06-14 NOTE — CARE PLAN
Problem: Communication  Goal: The ability to communicate needs accurately and effectively will improve  Outcome: PROGRESSING AS EXPECTED  Note: POC discussed with patient. Cog eval still pending.      Problem: Pain Management  Goal: Pain level will decrease to patient's comfort goal  Outcome: PROGRESSING SLOWER THAN EXPECTED  Note: Patient continues to have 8-10/10 pain, medication given per MAR.

## 2020-06-14 NOTE — CARE PLAN
Problem: Communication  Goal: The ability to communicate needs accurately and effectively will improve  Outcome: PROGRESSING AS EXPECTED   Pt A&Ox4. Pt calling and using the call light appropriately.   Problem: Safety  Goal: Will remain free from falls  Outcome: PROGRESSING AS EXPECTED   Pt free from falls. Bed locked and in it's lowest position. Bed alarm on. Call light within reach. Hourly rounding done.
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
## 2020-06-14 NOTE — THERAPY
Speech Language Pathology  Daily Treatment     Patient Name: Enrique Rivera  Age:  44 y.o., Sex:  male  Medical Record #: [id number]  Today's Date: 6/14/2020     Precautions  Precautions: Swallow Precautions ( See Comments), Fall Risk  Comments: voice improving; reports it is back to baseline today 6/14    Assessment  Pt seen this date for dysphagia therapy. Per RN, pt has been requesting upgraded textures. Pt was eager for PO. Voice sounding improved, compared to previous SLP notes. Pt reporting his voice his back to baseline. Trials of single ice chips, MTL< and purees resulted in complete hyolaryngeal elevation upon palpation and timely initiation of swallow trigger. He cleared each bolus in 1 swallow and did not have any reflexive coughing, improvement from previous session. Trials of soft solids and thin liquids resulted in throat clearing ~40% of trials, which may be concerning for possible penetration/aspiration. At this time, and given FEES results on 6/11 indicating poor airway protection and silent aspiration, recommend pt continue with LQ3/MT2 (Liquidised/mildly thick liquids) and adherence to posted swallow precautions. Please hold PO with any difficulty or s/sx concerning for aspiration. Suspect pt may be able to upgrade to more advanced textures in next couple of days. SLP to continue following.     Plan  Continue current treatment plan. Adherence to posted swallow precautions: small bites/sips, slow rate. Crush meds in applesauce.     Discharge recommendations: Recommend inpatient transitional care services for continued speech therapy services.       Objective     06/14/20 1418   Precautions   Precautions Swallow Precautions ( See Comments);Fall Risk   Dysphagia    Positioning / Behavior Modification Self Monitoring;Modulate Rate or Bite Size   Diet / Liquid Recommendation Liquidised (3) - (Nectar Thick Full Liquid);Mildly Thick (2) - (Nectar Thick)   Short Term Goals   Short Term Goal # 1 B  Patient  will consume meals of Liquidized solids/Mildly Thick liquids with no s/sx of aspiration given monitoring during meals.

## 2020-06-15 PROCEDURE — 700111 HCHG RX REV CODE 636 W/ 250 OVERRIDE (IP): Performed by: NURSE PRACTITIONER

## 2020-06-15 PROCEDURE — 92526 ORAL FUNCTION THERAPY: CPT

## 2020-06-15 PROCEDURE — 700101 HCHG RX REV CODE 250: Performed by: NURSE PRACTITIONER

## 2020-06-15 PROCEDURE — 92523 SPEECH SOUND LANG COMPREHEN: CPT

## 2020-06-15 PROCEDURE — 700112 HCHG RX REV CODE 229: Performed by: NURSE PRACTITIONER

## 2020-06-15 PROCEDURE — A9270 NON-COVERED ITEM OR SERVICE: HCPCS | Performed by: SURGERY

## 2020-06-15 PROCEDURE — A9270 NON-COVERED ITEM OR SERVICE: HCPCS | Performed by: NURSE PRACTITIONER

## 2020-06-15 PROCEDURE — 700102 HCHG RX REV CODE 250 W/ 637 OVERRIDE(OP): Performed by: SURGERY

## 2020-06-15 PROCEDURE — 770006 HCHG ROOM/CARE - MED/SURG/GYN SEMI*

## 2020-06-15 PROCEDURE — 700102 HCHG RX REV CODE 250 W/ 637 OVERRIDE(OP): Performed by: NURSE PRACTITIONER

## 2020-06-15 RX ORDER — OXYCODONE HYDROCHLORIDE 5 MG/1
5 TABLET ORAL EVERY 6 HOURS PRN
Status: DISCONTINUED | OUTPATIENT
Start: 2020-06-15 | End: 2020-06-16 | Stop reason: HOSPADM

## 2020-06-15 RX ADMIN — METFORMIN HYDROCHLORIDE 750 MG: 750 TABLET, EXTENDED RELEASE ORAL at 21:51

## 2020-06-15 RX ADMIN — ENOXAPARIN SODIUM 30 MG: 30 INJECTION SUBCUTANEOUS at 17:19

## 2020-06-15 RX ADMIN — OXYCODONE 5 MG: 5 TABLET ORAL at 15:00

## 2020-06-15 RX ADMIN — ACETAMINOPHEN 650 MG: 325 TABLET, FILM COATED ORAL at 03:11

## 2020-06-15 RX ADMIN — AMITRIPTYLINE HYDROCHLORIDE 50 MG: 50 TABLET, FILM COATED ORAL at 17:19

## 2020-06-15 RX ADMIN — OXYCODONE 5 MG: 5 TABLET ORAL at 08:43

## 2020-06-15 RX ADMIN — OXYCODONE 5 MG: 5 TABLET ORAL at 03:11

## 2020-06-15 RX ADMIN — PAROXETINE HYDROCHLORIDE 10 MG: 20 TABLET, FILM COATED ORAL at 04:56

## 2020-06-15 RX ADMIN — LEVETIRACETAM 500 MG: 500 TABLET ORAL at 04:56

## 2020-06-15 RX ADMIN — DOCUSATE SODIUM 100 MG: 100 CAPSULE, LIQUID FILLED ORAL at 17:19

## 2020-06-15 RX ADMIN — METFORMIN HYDROCHLORIDE 750 MG: 750 TABLET, EXTENDED RELEASE ORAL at 11:33

## 2020-06-15 RX ADMIN — LEVETIRACETAM 500 MG: 500 TABLET ORAL at 17:19

## 2020-06-15 RX ADMIN — ACETAMINOPHEN 650 MG: 325 TABLET, FILM COATED ORAL at 21:00

## 2020-06-15 RX ADMIN — GLIPIZIDE 5 MG: 5 TABLET ORAL at 04:56

## 2020-06-15 RX ADMIN — LIDOCAINE 1 PATCH: 50 PATCH TOPICAL at 08:43

## 2020-06-15 RX ADMIN — FERROUS GLUCONATE 324 MG: 324 TABLET ORAL at 17:19

## 2020-06-15 RX ADMIN — FERROUS GLUCONATE 324 MG: 324 TABLET ORAL at 08:43

## 2020-06-15 RX ADMIN — OXYCODONE 5 MG: 5 TABLET ORAL at 21:00

## 2020-06-15 RX ADMIN — ENOXAPARIN SODIUM 30 MG: 30 INJECTION SUBCUTANEOUS at 04:56

## 2020-06-15 ASSESSMENT — FIBROSIS 4 INDEX: FIB4 SCORE: 1.78

## 2020-06-15 ASSESSMENT — ENCOUNTER SYMPTOMS
MYALGIAS: 0
NECK PAIN: 0
NAUSEA: 0
HEADACHES: 0
SHORTNESS OF BREATH: 0
BLURRED VISION: 0
DIZZINESS: 0
ABDOMINAL PAIN: 0
DOUBLE VISION: 0
SPEECH CHANGE: 0
CHILLS: 0
VOMITING: 0
BACK PAIN: 0
FOCAL WEAKNESS: 0
FEVER: 0
TINGLING: 0
SENSORY CHANGE: 0
CONSTIPATION: 0

## 2020-06-15 ASSESSMENT — PATIENT HEALTH QUESTIONNAIRE - PHQ9
2. FEELING DOWN, DEPRESSED, IRRITABLE, OR HOPELESS: NOT AT ALL
SUM OF ALL RESPONSES TO PHQ9 QUESTIONS 1 AND 2: 0
1. LITTLE INTEREST OR PLEASURE IN DOING THINGS: NOT AT ALL

## 2020-06-15 NOTE — THERAPY
"Speech Language Pathology  Daily Treatment     Patient Name: Enrique Rivera  Age:  44 y.o., Sex:  male  Medical Record #: [id number]  Today's Date: 6/15/2020     Precautions  Precautions: Fall Risk, Swallow Precautions ( See Comments)  Comments: Voice is strong and clear; reports it is \"almost back to baseline\"     Assessment  Patient seen this date for dysphagia tx session. Patient currently on LQ3/MT2 diet. Patient very pleasant and cooperative, but reported eagerness for diet upgrade. Patient seen with LQ3/MT2 breakfast tray. Patient consumed PO trials of MTL via tsp and cup sip, 4 oz. Pudding, and 4 oz. Soft solids with no s/sx of aspiration. Vocal quality was strong and clear, but patient reports \"I think it's slightly weaker than yesterday because I talked a lot.\" Laryngeal elevation palpated as weak. Initiation of swallow trigger was timely. Patient required minimal verbal cues to reduce bite/sip size and rate, but once cued, follow precautions with ease. At this time, recommend patient upgrade to SB6/MT2 diet (soft/bite-sized/nectar-thick) with use of swallow precautions. No straws. Ok for meds whole in puree.     Plan    Continue current treatment plan.    Discharge recommendations:  Recommend inpatient transitional care services for continued speech therapy services.       Objective     06/15/20 0935   Precautions   Comments Voice is strong and clear; reports it is \"almost back to baseline\"    Vitals   O2 Delivery Device None - Room Air   Dysphagia    Positioning / Behavior Modification Self Monitoring;Modulate Rate or Bite Size;Multiple Swallows   Diet / Liquid Recommendation Soft & Bite-Sized (6) - (Dysphagia III);Mildly Thick (2) - (Nectar Thick)   Nursing Communication Swallow Precaution Sign Posted at Head of Bed   Skilled Intervention Verbal Cueing;Compensatory Strategies   Recommended Route of Medication Administration   Medication Administration  Float Whole with Puree   Short Term Goals   Short Term " Goal # 1 B  Patient will consume meals of Liquidized solids/Mildly Thick liquids with no s/sx of aspiration given monitoring during meals.    Goal Outcome  # 1 B Goal met   Short Term Goal # 2 Patient will independently consume SB6/MT2 diet with use of swallow precautions and no s/sx of aspiration.

## 2020-06-15 NOTE — THERAPY
"Speech Language Pathology   Initial Assessment     Patient Name: Enrique Rivera  AGE:  44 y.o., SEX:  male  Medical Record #: [id number]  Today's Date: 6/15/2020     Precautions  Precautions: Fall Risk, Swallow Precautions ( See Comments)  Comments: Voice is strong and clear; reports it is \"almost back to baseline\"     Assessment  Patient seen this date for cognitive-linguistic evaluation. Patient awake, alert, pleasant, and oriented in all spheres. Patient was administered the Cognistat in combination with other non-standard assessments. Patient presented with minimal deficits in short-term memory and medication management, both of which he reports are baseline and stated this prior to assessment in these areas. He reported \"My mom always helps me with my meds because I've messed them up before when I was on too many at once.\" Patient performed WNL across all other domains tested. Patient educated on TBI, cognition, side effects, and obtaining outpatient SLP services if any deficits persist or worsen. Patient verbalized understanding of SLP recs.      At this time, patient appears at baseline and does not appear to require any further acute or post-acute SLP services for cognition unless any deficits persist or worsen. Recommend patient be provided with supervision for medication management and reminders for same, which he reports his mother already assists with, and can continue to do so. Please reconsult SLP if needed.     Plan    Recommend Speech Therapy for Evaluation only for the following treatments:  Patient / Family / Caregiver Education. SLP will continue for dysphagia tx during acute stay. Anticipate patient will have no further SLP services after d/c.     Discharge recommendations:  SLP evaluation completed.  Patient is currently not being actively followed for therapy services at this time, however may be seen if requested by attending provider for 1 more visit within 30 days to address any discharge needs " or if the patient has a change in status.       Objective     06/15/20 1045   Vitals   O2 Delivery Device None - Room Air   Prior Living Situation   Lives with - Patient's Self Care Capacity Child Less than 18 Years of Age;Parents   Comments Patient reports he lives with his mom and has his 3 daughters (ages 6, 8, 12) on school breaks    Prior Level Of Function   Communication Within Functional Limits   Swallow Within Functional Limits   Dentition Intact   Dentures None   Hearing Within Functional Limits for Evaluation   Hearing Aid None   Vision Within Functional Limits for Evaluation   Patient's Primary Language English   Education High School Graduate or GED   Education Years Completed 12   Occupation (Pre-Hospital Vocational) 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 )   Verbal Expression   Vocal Quality Clear   Verbal Output Automatic Within Functional Limits (6-7)   Verbal Output: Phrases Within Functional Limits (6-7)   Verbal Output Conversation Within Functional Limits (6-7)   Verbal Output Functional Within Functional Limits (6-7)   Repetition: Single Words Within Functional Limits (6-7)   Repetition: Phrases Within Functional Limits (6-7)   Repetition: Sentences Within Functional Limits (6-7)   Naming Within Functional Limits (6-7)   Auditory Comprehension   Yes / No Questions: Personal Information Within Functional Limits (6-7)   Yes / No Questions: General Information Within Functional Limits (6-7)   Yes / No Questions: Abstract Within Functional Limits (6-7)   Identifies Objects Within Functional Limits (6-7)   Identifies Pictures Within Functional Limits (6-7)   Follows One Unit Commands Within Functional Limits (6-7)   Follows Two Unit Commands Within Functional Limits (6-7)   Follows Three Unit Commands Within Functional Limits (6-7)   Understands Simple, Structured Conversation  Within Functional Limits (6-7)   Understands Complex Conversation Within Functional Limits (6-7)    Reading Comprehension   Reading Words Within Functional Limits (6-7)   Reading Phrases Within Functional Limits (6-7)   Reading Sentences Within Functional Limits (6-7)   Reading Short Paragraphs  Within Functional Limits (6-7)   Written Expression   Functional Writing: Name Within Functional Limits (6-7)   Functional Writing Dictation: Word Within Functional Limits (6-7)   Functional Writing to Dictation: Sentence Within Functional Limits (6-7)   Formulates: Sentence Within Functional Limits (6-7)   Formulates: Multi Sentence Within Functional Limits (6-7)   Overall Legibility Within Functional Limits (6-7)   Cognitive-Linguistic   Level of Consciousness Alert   Orientation Level Oriented x 4   Sustained Attention Within Functional Limits (6-7)   Short Term Memory Supervision (5)   Immediate Memory Within Functional Limits (6-7)   Long Term Memory / Reminiscing Within Functional Limits (6-7)   Simple Reasoning / Problem Solving Within Functional Limits (6-7)   Complex Reasoning  / Problem Solving Within Functional Limits (6-7)   Safety Awareness Within Functional Limits (6-7)   Insight into Deficits Within Functional Limits (6-7)   Auditory Math Within Functional Limits (6-7)   Medication Management  Minimal (4)   Clock Drawing Within Functional Limits   Social / Pragmatic Communication   Social / Pragmatic Communication WDL

## 2020-06-15 NOTE — CARE PLAN
Problem: Safety  Goal: Will remain free from falls  Outcome: PROGRESSING AS EXPECTED     Problem: Skin Integrity  Goal: Risk for impaired skin integrity will decrease  Outcome: PROGRESSING AS EXPECTED     Problem: Psychosocial Needs:  Goal: Level of anxiety will decrease  Outcome: PROGRESSING AS EXPECTED
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
## 2020-06-16 ENCOUNTER — PATIENT OUTREACH (OUTPATIENT)
Dept: HEALTH INFORMATION MANAGEMENT | Facility: OTHER | Age: 45
End: 2020-06-16

## 2020-06-16 VITALS
TEMPERATURE: 97.1 F | OXYGEN SATURATION: 96 % | BODY MASS INDEX: 23.4 KG/M2 | RESPIRATION RATE: 18 BRPM | HEIGHT: 71 IN | HEART RATE: 108 BPM | SYSTOLIC BLOOD PRESSURE: 114 MMHG | DIASTOLIC BLOOD PRESSURE: 80 MMHG | WEIGHT: 167.11 LBS

## 2020-06-16 PROBLEM — R13.12 OROPHARYNGEAL DYSPHAGIA: Status: RESOLVED | Noted: 2020-06-13 | Resolved: 2020-06-16

## 2020-06-16 PROCEDURE — 700112 HCHG RX REV CODE 229: Performed by: NURSE PRACTITIONER

## 2020-06-16 PROCEDURE — 92526 ORAL FUNCTION THERAPY: CPT

## 2020-06-16 PROCEDURE — 700111 HCHG RX REV CODE 636 W/ 250 OVERRIDE (IP): Performed by: NURSE PRACTITIONER

## 2020-06-16 PROCEDURE — 700102 HCHG RX REV CODE 250 W/ 637 OVERRIDE(OP): Performed by: SURGERY

## 2020-06-16 PROCEDURE — A9270 NON-COVERED ITEM OR SERVICE: HCPCS | Performed by: NURSE PRACTITIONER

## 2020-06-16 PROCEDURE — A9270 NON-COVERED ITEM OR SERVICE: HCPCS | Performed by: SURGERY

## 2020-06-16 PROCEDURE — 700102 HCHG RX REV CODE 250 W/ 637 OVERRIDE(OP): Performed by: NURSE PRACTITIONER

## 2020-06-16 PROCEDURE — 700101 HCHG RX REV CODE 250: Performed by: NURSE PRACTITIONER

## 2020-06-16 RX ORDER — LIDOCAINE 50 MG/G
1 PATCH TOPICAL EVERY 24 HOURS
Qty: 5 PATCH | Refills: 0 | Status: SHIPPED | OUTPATIENT
Start: 2020-06-17 | End: 2020-06-22

## 2020-06-16 RX ORDER — FERROUS GLUCONATE 324(38)MG
324 TABLET ORAL 2 TIMES DAILY WITH MEALS
Qty: 30 TAB | COMMUNITY
Start: 2020-06-16 | End: 2021-01-21

## 2020-06-16 RX ORDER — OXYCODONE HYDROCHLORIDE 5 MG/1
5 TABLET ORAL EVERY 6 HOURS PRN
Qty: 20 TAB | Refills: 0 | Status: SHIPPED | OUTPATIENT
Start: 2020-06-16 | End: 2020-06-21

## 2020-06-16 RX ADMIN — ACETAMINOPHEN 650 MG: 325 TABLET, FILM COATED ORAL at 05:09

## 2020-06-16 RX ADMIN — FERROUS GLUCONATE 324 MG: 324 TABLET ORAL at 08:45

## 2020-06-16 RX ADMIN — METFORMIN HYDROCHLORIDE 750 MG: 750 TABLET, EXTENDED RELEASE ORAL at 08:53

## 2020-06-16 RX ADMIN — DOCUSATE SODIUM 100 MG: 100 CAPSULE, LIQUID FILLED ORAL at 05:09

## 2020-06-16 RX ADMIN — PAROXETINE HYDROCHLORIDE 10 MG: 20 TABLET, FILM COATED ORAL at 05:08

## 2020-06-16 RX ADMIN — OXYCODONE 5 MG: 5 TABLET ORAL at 05:09

## 2020-06-16 RX ADMIN — LIDOCAINE 1 PATCH: 50 PATCH TOPICAL at 08:45

## 2020-06-16 RX ADMIN — GLIPIZIDE 5 MG: 5 TABLET ORAL at 05:08

## 2020-06-16 RX ADMIN — ENOXAPARIN SODIUM 30 MG: 30 INJECTION SUBCUTANEOUS at 05:08

## 2020-06-16 ASSESSMENT — ENCOUNTER SYMPTOMS
SENSORY CHANGE: 0
VOMITING: 0
MYALGIAS: 0
DIZZINESS: 0
FOCAL WEAKNESS: 0
NECK PAIN: 0
DOUBLE VISION: 0
BACK PAIN: 0
ABDOMINAL PAIN: 0
CONSTIPATION: 0
FEVER: 0
CHILLS: 0
TINGLING: 0
HEADACHES: 0
BLURRED VISION: 0
SHORTNESS OF BREATH: 0
NAUSEA: 0
SPEECH CHANGE: 0

## 2020-06-16 NOTE — CARE PLAN
Problem: Safety  Goal: Will remain free from injury  Outcome: PROGRESSING AS EXPECTED  Goal: Will remain free from falls  Outcome: PROGRESSING AS EXPECTED     Problem: Venous Thromboembolism (VTW)/Deep Vein Thrombosis (DVT) Prevention:  Goal: Patient will participate in Venous Thrombosis (VTE)/Deep Vein Thrombosis (DVT)Prevention Measures  Outcome: PROGRESSING AS EXPECTED  Flowsheets  Taken 6/15/2020 1920 by Sharon Veras RBENITEZ  Risk Assessment Score: 1  VTE RISK: Moderate  Mechanical Prophylaxis: SCDs, Sequential Compression Device  SCDs, Sequential Compression Device: Refused  Pharmacologic Prophylaxis Used: LMWH: Enoxaparin(Lovenox)  Taken 6/15/2020 0800 by Monalisa Villa R.NOctavio  AV Foot Pumps: Refused  Taken 6/12/2020 2000 by Tasha Salgado ROctavioNOctavio  ALEXIS Hose (Graduated Compression Stockings): Off     Problem: Communication  Goal: The ability to communicate needs accurately and effectively will improve  Outcome: MET

## 2020-06-16 NOTE — PROGRESS NOTES
Received handoff report from TEETEE RN. The pt. is A&Ox4. Discussed POC with pt. For DC pending repeat SLP swallow eval tomorrow. At this time the pt. states no needs and has no c/o pain.

## 2020-06-16 NOTE — CARE PLAN
"  Problem: Discharge Barriers/Planning  Goal: Patient's continuum of care needs will be met  Outcome: PROGRESSING AS EXPECTED  Intervention: Assess potential discharge barriers on admission and throughout hospital stay  Note: Home pending SLP clearance.    RE Goncalves at the bedside.     Problem: Pain Management  Goal: Pain level will decrease to patient's comfort goal  Outcome: PROGRESSING AS EXPECTED  Intervention: Follow pain managment plan developed in collaboration with patient and Interdisciplinary Team  Note: C/O left shoulder pain 5/10 \"aching.\" Healing abrasion to site. Lidocaine patch applied to adjacent intact skin.     "
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
## 2020-06-16 NOTE — CARE PLAN
Pt A+Ox4, occasionally impulsive. C/O L shoulder pain, treated with PRN oxycodone 5mg and lidocaine patch to area. Pt followed by SLP, advanced from a level 3 full liquid diet to a level 6 diet. Continuing with mildly thick liquids. SLP will re-evaluate tomorrow. Plan to DC home when pt is back on a regular diet.
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
## 2020-06-16 NOTE — THERAPY
"Speech Language Pathology  Daily Treatment     Patient Name: Enrique Rivera  Age:  44 y.o., Sex:  male  Medical Record #: [id number]  Today's Date: 6/16/2020     Precautions  Precautions: Fall Risk, Swallow Precautions ( See Comments)  Comments: Voice is strong and clear; reports it is \"almost back to baseline\"     Assessment  Patient seen this date for dysphagia reassessment and tx session, per APN request, as patient is likely discharging home with his mother's support today. Patient currently on SB6/MT2 diet and per RN and patient, patient appears to be tolerating diet without difficulty. Patient awake, alert, pleasant, and with strong, clear vocal quality, which has improved since yesterday. Patient reports vocal quality is back to baseline. Patient consumed PO trials of soft solids, mixed consistencies, pineapple, crackers, and 12 oz. thin liquids via cup sip and straw. Patient consumed all trials independently, utilizing swallow precautions. Patient had no s/sx of aspiration on any consistencies trialed. Laryngeal elevation palpated as complete, initiation of swallow trigger was timely, and no oral residue was noted.     Recommend upgrade to regular/thin liquid diet. Ok for meds whole w/ thin liquid wash. SLP to check tolerance 1-2x if patient does not d/c.     Plan    Continue current treatment plan. (if patient does not d/c)     Discharge recommendations:  Anticipate that the patient will have no further speech therapy needs after discharge from the hospital.     Objective     06/16/20 0937   Precautions   Comments Voice is strong and clear; reports it is \"almost back to baseline\"    Vitals   O2 Delivery Device None - Room Air   Dysphagia    Positioning / Behavior Modification Self Monitoring;Modulate Rate or Bite Size   Other Treatments PO trials of soft solids, mixed consistencies, crackers, and thins via cup sip and straw    Diet / Liquid Recommendation Regular (7);Thin (0)   Nursing Communication Swallow " Precaution Sign Posted at Head of Bed   Skilled Intervention Verbal Cueing   Recommended Route of Medication Administration   Medication Administration  Whole with Liquid Wash   Short Term Goals   Short Term Goal # 2 Patient will independently consume SB6/MT2 diet with use of swallow precautions and no s/sx of aspiration.    Goal Outcome # 2  Goal met, new goal aded   Short Term Goal # 2 B  Patient will independently consume regular diet w/ thin liquids with no overt s/sx of aspiration.

## 2020-07-10 PROBLEM — F19.10 POLYSUBSTANCE ABUSE (HCC): Status: ACTIVE | Noted: 2020-07-10

## 2020-07-10 PROBLEM — K27.9 H PYLORI ULCER: Status: RESOLVED | Noted: 2020-04-06 | Resolved: 2020-07-10

## 2020-07-10 PROBLEM — F41.9 ANXIETY: Status: ACTIVE | Noted: 2020-07-10

## 2020-07-10 PROBLEM — R19.7 DIARRHEA: Status: RESOLVED | Noted: 2020-04-10 | Resolved: 2020-07-10

## 2020-07-10 PROBLEM — B96.81 H PYLORI ULCER: Status: RESOLVED | Noted: 2020-04-06 | Resolved: 2020-07-10

## 2020-08-31 PROBLEM — E11.9 TYPE 2 DIABETES MELLITUS (HCC): Status: RESOLVED | Noted: 2020-06-09 | Resolved: 2020-08-31

## 2020-09-13 PROBLEM — M79.604 PAIN OF RIGHT LOWER EXTREMITY: Status: ACTIVE | Noted: 2020-09-13

## 2020-09-13 PROBLEM — Z71.89 COMPLEX CARE COORDINATION: Status: ACTIVE | Noted: 2020-09-13

## 2020-09-13 PROBLEM — S91.109D OPEN TOE WOUND, SUBSEQUENT ENCOUNTER: Status: ACTIVE | Noted: 2020-09-13

## 2020-10-11 PROBLEM — A41.9 SEPSIS (HCC): Status: ACTIVE | Noted: 2020-10-11

## 2020-10-11 PROBLEM — R60.9 PERIPHERAL EDEMA: Status: ACTIVE | Noted: 2020-10-11

## 2020-10-11 PROBLEM — M86.9 TOE OSTEOMYELITIS, RIGHT (HCC): Status: ACTIVE | Noted: 2020-10-11

## 2020-10-13 PROBLEM — R68.89 ABNORMAL ANKLE BRACHIAL INDEX (ABI): Status: ACTIVE | Noted: 2020-10-13

## 2020-10-14 PROBLEM — I73.9 PERIPHERAL VASCULAR DISEASE (HCC): Status: ACTIVE | Noted: 2020-10-14

## 2020-10-27 ENCOUNTER — NURSE TRIAGE (OUTPATIENT)
Dept: HEALTH INFORMATION MANAGEMENT | Facility: OTHER | Age: 45
End: 2020-10-27

## 2020-10-28 NOTE — TELEPHONE ENCOUNTER
Coleen Mother of patient is calling:    Diabetic patient with low BS of  62 15 minutes ago. Patient is reported shaky.  He drank orange juice and reports feeling better. Instructed on sick days and checking BS more often. Discussed signs and symptoms of hypoglycemia.     Patient seen for osteomyelitis and had 2 toes removed on the RIGHT foot and 1 toe on the LEFT foot.    Reason for Disposition  • Blood glucose < 70 mg/dL (3.9 mmol/L) or symptomatic AND has other adult present    Additional Information  • Negative: Unconscious or difficult to awaken  • Negative: Seizure occurs  • Negative: Acting confused (e.g., disoriented, slurred speech)  • Negative: Very weak (can't stand)  • Negative: Sounds like a life-threatening emergency to the triager  • Negative: Vomiting and signs of dehydration (e.g., very dry mouth, lightheaded, dark urine, etc.)  • Negative: Low blood sugar symptoms persist > 30 minutes AND using low blood sugar Care Advice  • Negative: Low blood glucose (< 70 mg/dL or 3.9 mmol/L) persists > 30 minutes AND using low blood sugar Care Advice  • Negative: Patient sounds very sick or weak to the triager  • Negative: Diabetes medication overdose (e.g., insulin error) and triager unable to answer question  • Negative: Caller has URGENT medication or insulin pump question and triager unable to answer question  • Negative: Low blood sugar symptoms with no other adult present AND hasn't tried Care Advice  • Negative: Low blood glucose (< 70 mg/dL or 3.9 mmol/L) with no other adult present AND hasn't tried Care Advice  • Negative: Low blood glucose (< 70 mg/dL or 3.9 mmol/L) or symptomatic, now improved with Care Advice AND cause unknown  • Negative: Patient wants to be seen  • Negative: Morning (before breakfast) blood glucose < 80 mg/dL (4.4 mmol/L) and more than once in past week  • Negative: Evening (after bedtime snack) blood glucose < 100 mg/dL (5.6 mmol/L) and more than once in past week  • Negative:  "Caller has NON-URGENT medication or insulin pump question and triager unable to answer question    Answer Assessment - Initial Assessment Questions  1. SYMPTOMS: \"What symptoms are you concerned about?\"      Low blood sugar with 62 BS  2. ONSET:  \"When did the symptoms start?\"     15 to 20 minutes  3. BLOOD GLUCOSE: \"What is your blood glucose level?\"       70  4. USUAL RANGE: \"What is your blood glucose level usually?\" (e.g., usual fasting morning value, usual evening value)      Usually more high 150 to 200  5. TYPE 1 or 2:  \"Do you know what type of diabetes you have?\"  (e.g., Type 1, Type 2, Gestational; doesn't know)       Type 2  6. INSULIN: \"Do you take insulin?\" \"What type of insulin(s) do you use? What is the mode of delivery? (syringe, pen (e.g., injection or  pump)       no  7. DIABETES PILLS: \"Do you take any pills for your diabetes?\"      metformin  8. OTHER SYMPTOMS: \"Do you have any symptoms?\" (e.g., fever, frequent urination, difficulty breathing, vomiting)      no  9. LOW BLOOD GLUCOSE TREATMENT: \"What have you done so far to treat the low blood glucose level?\"      Two glasses of orange juice  10. FOOD: \"When did you last eat or drink?\"       Hamburger around 2 pm  11. ALONE: \"Are you alone right now or is someone with you?\"         He lives with [facility]  12. PREGNANCY: \"Is there any chance you are pregnant?\" \"When was your last menstrual period?\"        NO    Protocols used: DIABETES - LOW BLOOD SUGAR-A-OH      "

## 2020-11-05 PROBLEM — A41.9 SEPSIS (HCC): Status: RESOLVED | Noted: 2020-10-11 | Resolved: 2020-11-05

## 2020-11-05 PROBLEM — Z11.9 SCREENING EXAMINATION FOR INFECTIOUS DISEASE: Status: RESOLVED | Noted: 2020-06-09 | Resolved: 2020-11-05

## 2020-11-05 PROBLEM — R00.0 TACHYCARDIA: Status: RESOLVED | Noted: 2017-02-06 | Resolved: 2020-11-05

## 2020-11-05 PROBLEM — T14.90XA TRAUMA: Status: RESOLVED | Noted: 2020-06-09 | Resolved: 2020-11-05

## 2020-12-04 PROBLEM — S91.109D OPEN TOE WOUND, SUBSEQUENT ENCOUNTER: Status: RESOLVED | Noted: 2020-09-13 | Resolved: 2020-12-04

## 2020-12-04 PROBLEM — R60.0 LOWER EXTREMITY EDEMA: Status: ACTIVE | Noted: 2020-12-04

## 2021-04-24 PROBLEM — E16.2 HYPOGLYCEMIA: Status: ACTIVE | Noted: 2021-04-24

## 2021-04-24 PROBLEM — N17.9 AKI (ACUTE KIDNEY INJURY) (HCC): Status: ACTIVE | Noted: 2021-04-24

## 2021-06-05 ENCOUNTER — HOSPITAL ENCOUNTER (OUTPATIENT)
Dept: RADIOLOGY | Facility: MEDICAL CENTER | Age: 46
End: 2021-06-05
Payer: COMMERCIAL

## 2021-06-05 ENCOUNTER — HOSPITAL ENCOUNTER (INPATIENT)
Facility: MEDICAL CENTER | Age: 46
LOS: 3 days | DRG: 384 | End: 2021-06-08
Attending: INTERNAL MEDICINE | Admitting: STUDENT IN AN ORGANIZED HEALTH CARE EDUCATION/TRAINING PROGRAM
Payer: COMMERCIAL

## 2021-06-05 PROBLEM — R65.21 SEPTIC SHOCK (HCC): Status: ACTIVE | Noted: 2020-10-11

## 2021-06-05 PROBLEM — R10.13 EPIGASTRIC PAIN: Status: ACTIVE | Noted: 2021-06-05

## 2021-06-05 LAB
AMYLASE SERPL-CCNC: 57 U/L (ref 20–103)
ANION GAP SERPL CALC-SCNC: 16 MMOL/L (ref 7–16)
APPEARANCE UR: ABNORMAL
BACTERIA #/AREA URNS HPF: NEGATIVE /HPF
BILIRUB UR QL STRIP.AUTO: NEGATIVE
BUN SERPL-MCNC: 44 MG/DL (ref 8–22)
CALCIUM SERPL-MCNC: 7.7 MG/DL (ref 8.5–10.5)
CHLORIDE SERPL-SCNC: 103 MMOL/L (ref 96–112)
CO2 SERPL-SCNC: 15 MMOL/L (ref 20–33)
COLOR UR: YELLOW
CREAT SERPL-MCNC: 6.68 MG/DL (ref 0.5–1.4)
EKG IMPRESSION: NORMAL
EPI CELLS #/AREA URNS HPF: ABNORMAL /HPF
GLUCOSE SERPL-MCNC: 139 MG/DL (ref 65–99)
GLUCOSE UR STRIP.AUTO-MCNC: 100 MG/DL
HYALINE CASTS #/AREA URNS LPF: ABNORMAL /LPF
INR PPP: 1.07 (ref 0.87–1.13)
KETONES UR STRIP.AUTO-MCNC: ABNORMAL MG/DL
LACTATE BLD-SCNC: 1.2 MMOL/L (ref 0.5–2)
LACTATE BLD-SCNC: 1.4 MMOL/L (ref 0.5–2)
LEUKOCYTE ESTERASE UR QL STRIP.AUTO: ABNORMAL
LIPASE SERPL-CCNC: 35 U/L (ref 11–82)
LIPASE SERPL-CCNC: 41 U/L (ref 11–82)
MAGNESIUM SERPL-MCNC: 1.5 MG/DL (ref 1.5–2.5)
MICRO URNS: ABNORMAL
NITRITE UR QL STRIP.AUTO: NEGATIVE
NT-PROBNP SERPL IA-MCNC: 302 PG/ML (ref 0–125)
PH UR STRIP.AUTO: 5 [PH] (ref 5–8)
POTASSIUM SERPL-SCNC: 3.7 MMOL/L (ref 3.6–5.5)
PROCALCITONIN SERPL-MCNC: 0.22 NG/ML
PROT UR QL STRIP: 100 MG/DL
PROTHROMBIN TIME: 14.3 SEC (ref 12–14.6)
RBC # URNS HPF: ABNORMAL /HPF
RBC UR QL AUTO: ABNORMAL
SODIUM SERPL-SCNC: 134 MMOL/L (ref 135–145)
SP GR UR STRIP.AUTO: 1.02
SPERM #/AREA URNS HPF: ABNORMAL /HPF
UROBILINOGEN UR STRIP.AUTO-MCNC: 0.2 MG/DL
WBC #/AREA URNS HPF: ABNORMAL /HPF

## 2021-06-05 PROCEDURE — 700102 HCHG RX REV CODE 250 W/ 637 OVERRIDE(OP): Performed by: STUDENT IN AN ORGANIZED HEALTH CARE EDUCATION/TRAINING PROGRAM

## 2021-06-05 PROCEDURE — 700105 HCHG RX REV CODE 258: Performed by: INTERNAL MEDICINE

## 2021-06-05 PROCEDURE — 81001 URINALYSIS AUTO W/SCOPE: CPT

## 2021-06-05 PROCEDURE — 700111 HCHG RX REV CODE 636 W/ 250 OVERRIDE (IP): Performed by: INTERNAL MEDICINE

## 2021-06-05 PROCEDURE — 83690 ASSAY OF LIPASE: CPT | Mod: 91

## 2021-06-05 PROCEDURE — 83880 ASSAY OF NATRIURETIC PEPTIDE: CPT

## 2021-06-05 PROCEDURE — 700101 HCHG RX REV CODE 250: Performed by: INTERNAL MEDICINE

## 2021-06-05 PROCEDURE — 93005 ELECTROCARDIOGRAM TRACING: CPT | Performed by: STUDENT IN AN ORGANIZED HEALTH CARE EDUCATION/TRAINING PROGRAM

## 2021-06-05 PROCEDURE — 99292 CRITICAL CARE ADDL 30 MIN: CPT | Performed by: INTERNAL MEDICINE

## 2021-06-05 PROCEDURE — 83735 ASSAY OF MAGNESIUM: CPT

## 2021-06-05 PROCEDURE — 93010 ELECTROCARDIOGRAM REPORT: CPT | Performed by: INTERNAL MEDICINE

## 2021-06-05 PROCEDURE — A9270 NON-COVERED ITEM OR SERVICE: HCPCS | Performed by: STUDENT IN AN ORGANIZED HEALTH CARE EDUCATION/TRAINING PROGRAM

## 2021-06-05 PROCEDURE — 83605 ASSAY OF LACTIC ACID: CPT | Mod: 91

## 2021-06-05 PROCEDURE — 99291 CRITICAL CARE FIRST HOUR: CPT | Performed by: INTERNAL MEDICINE

## 2021-06-05 PROCEDURE — 84145 PROCALCITONIN (PCT): CPT

## 2021-06-05 PROCEDURE — C9113 INJ PANTOPRAZOLE SODIUM, VIA: HCPCS | Performed by: INTERNAL MEDICINE

## 2021-06-05 PROCEDURE — 99223 1ST HOSP IP/OBS HIGH 75: CPT | Performed by: STUDENT IN AN ORGANIZED HEALTH CARE EDUCATION/TRAINING PROGRAM

## 2021-06-05 PROCEDURE — 82150 ASSAY OF AMYLASE: CPT

## 2021-06-05 PROCEDURE — 700111 HCHG RX REV CODE 636 W/ 250 OVERRIDE (IP): Performed by: STUDENT IN AN ORGANIZED HEALTH CARE EDUCATION/TRAINING PROGRAM

## 2021-06-05 PROCEDURE — 80048 BASIC METABOLIC PNL TOTAL CA: CPT

## 2021-06-05 PROCEDURE — 770022 HCHG ROOM/CARE - ICU (200)

## 2021-06-05 PROCEDURE — 85610 PROTHROMBIN TIME: CPT

## 2021-06-05 RX ORDER — SODIUM CHLORIDE 9 MG/ML
1000 INJECTION, SOLUTION INTRAVENOUS ONCE
Status: COMPLETED | OUTPATIENT
Start: 2021-06-05 | End: 2021-06-05

## 2021-06-05 RX ORDER — HYDROMORPHONE HYDROCHLORIDE 1 MG/ML
.5-1 INJECTION, SOLUTION INTRAMUSCULAR; INTRAVENOUS; SUBCUTANEOUS
Status: DISCONTINUED | OUTPATIENT
Start: 2021-06-05 | End: 2021-06-06

## 2021-06-05 RX ORDER — BISACODYL 10 MG
10 SUPPOSITORY, RECTAL RECTAL
Status: DISCONTINUED | OUTPATIENT
Start: 2021-06-05 | End: 2021-06-05

## 2021-06-05 RX ORDER — HEPARIN SODIUM 5000 [USP'U]/ML
5000 INJECTION, SOLUTION INTRAVENOUS; SUBCUTANEOUS EVERY 8 HOURS
Status: DISCONTINUED | OUTPATIENT
Start: 2021-06-05 | End: 2021-06-08 | Stop reason: HOSPADM

## 2021-06-05 RX ORDER — FLUTICASONE PROPIONATE 50 MCG
2 SPRAY, SUSPENSION (ML) NASAL
Status: DISCONTINUED | OUTPATIENT
Start: 2021-06-05 | End: 2021-06-08 | Stop reason: HOSPADM

## 2021-06-05 RX ORDER — PROCHLORPERAZINE EDISYLATE 5 MG/ML
5-10 INJECTION INTRAMUSCULAR; INTRAVENOUS EVERY 4 HOURS PRN
Status: DISCONTINUED | OUTPATIENT
Start: 2021-06-05 | End: 2021-06-08 | Stop reason: HOSPADM

## 2021-06-05 RX ORDER — PROMETHAZINE HYDROCHLORIDE 25 MG/1
12.5-25 SUPPOSITORY RECTAL EVERY 4 HOURS PRN
Status: DISCONTINUED | OUTPATIENT
Start: 2021-06-05 | End: 2021-06-08 | Stop reason: HOSPADM

## 2021-06-05 RX ORDER — PANTOPRAZOLE SODIUM 40 MG/10ML
40 INJECTION, POWDER, LYOPHILIZED, FOR SOLUTION INTRAVENOUS 2 TIMES DAILY
Status: DISCONTINUED | OUTPATIENT
Start: 2021-06-05 | End: 2021-06-06

## 2021-06-05 RX ORDER — AMOXICILLIN 250 MG
2 CAPSULE ORAL 2 TIMES DAILY
Status: DISCONTINUED | OUTPATIENT
Start: 2021-06-05 | End: 2021-06-05

## 2021-06-05 RX ORDER — ONDANSETRON 4 MG/1
4 TABLET, ORALLY DISINTEGRATING ORAL EVERY 4 HOURS PRN
Status: DISCONTINUED | OUTPATIENT
Start: 2021-06-05 | End: 2021-06-08 | Stop reason: HOSPADM

## 2021-06-05 RX ORDER — MAGNESIUM SULFATE HEPTAHYDRATE 40 MG/ML
2 INJECTION, SOLUTION INTRAVENOUS ONCE
Status: COMPLETED | OUTPATIENT
Start: 2021-06-05 | End: 2021-06-05

## 2021-06-05 RX ORDER — PROMETHAZINE HYDROCHLORIDE 25 MG/1
12.5-25 TABLET ORAL EVERY 4 HOURS PRN
Status: DISCONTINUED | OUTPATIENT
Start: 2021-06-05 | End: 2021-06-08 | Stop reason: HOSPADM

## 2021-06-05 RX ORDER — SODIUM CHLORIDE, SODIUM LACTATE, POTASSIUM CHLORIDE, AND CALCIUM CHLORIDE .6; .31; .03; .02 G/100ML; G/100ML; G/100ML; G/100ML
1000 INJECTION, SOLUTION INTRAVENOUS ONCE
Status: DISCONTINUED | OUTPATIENT
Start: 2021-06-05 | End: 2021-06-05

## 2021-06-05 RX ORDER — NOREPINEPHRINE BITARTRATE 0.03 MG/ML
0-30 INJECTION, SOLUTION INTRAVENOUS CONTINUOUS
Status: DISCONTINUED | OUTPATIENT
Start: 2021-06-05 | End: 2021-06-06

## 2021-06-05 RX ORDER — ACETAMINOPHEN 325 MG/1
650 TABLET ORAL EVERY 6 HOURS PRN
Status: DISCONTINUED | OUTPATIENT
Start: 2021-06-05 | End: 2021-06-08 | Stop reason: HOSPADM

## 2021-06-05 RX ORDER — POLYETHYLENE GLYCOL 3350 17 G/17G
1 POWDER, FOR SOLUTION ORAL
Status: DISCONTINUED | OUTPATIENT
Start: 2021-06-05 | End: 2021-06-05

## 2021-06-05 RX ORDER — LEVOFLOXACIN 5 MG/ML
750 INJECTION, SOLUTION INTRAVENOUS EVERY 24 HOURS
Status: DISCONTINUED | OUTPATIENT
Start: 2021-06-05 | End: 2021-06-05

## 2021-06-05 RX ORDER — METOPROLOL TARTRATE 50 MG/1
25 TABLET, FILM COATED ORAL 2 TIMES DAILY
COMMUNITY
End: 2021-07-09

## 2021-06-05 RX ORDER — SODIUM CHLORIDE, SODIUM LACTATE, POTASSIUM CHLORIDE, CALCIUM CHLORIDE 600; 310; 30; 20 MG/100ML; MG/100ML; MG/100ML; MG/100ML
INJECTION, SOLUTION INTRAVENOUS CONTINUOUS
Status: DISCONTINUED | OUTPATIENT
Start: 2021-06-05 | End: 2021-06-08 | Stop reason: HOSPADM

## 2021-06-05 RX ORDER — LIDOCAINE HYDROCHLORIDE 10 MG/ML
INJECTION, SOLUTION INFILTRATION; PERINEURAL
Status: DISCONTINUED
Start: 2021-06-05 | End: 2021-06-05

## 2021-06-05 RX ORDER — FLUTICASONE PROPIONATE 50 MCG
2 SPRAY, SUSPENSION (ML) NASAL DAILY
Status: DISCONTINUED | OUTPATIENT
Start: 2021-06-05 | End: 2021-06-05

## 2021-06-05 RX ORDER — ONDANSETRON 2 MG/ML
4 INJECTION INTRAMUSCULAR; INTRAVENOUS EVERY 4 HOURS PRN
Status: DISCONTINUED | OUTPATIENT
Start: 2021-06-05 | End: 2021-06-08 | Stop reason: HOSPADM

## 2021-06-05 RX ORDER — SODIUM CHLORIDE 9 MG/ML
2000 INJECTION, SOLUTION INTRAVENOUS ONCE
Status: COMPLETED | OUTPATIENT
Start: 2021-06-05 | End: 2021-06-05

## 2021-06-05 RX ADMIN — HYDROMORPHONE HYDROCHLORIDE 1 MG: 1 INJECTION, SOLUTION INTRAMUSCULAR; INTRAVENOUS; SUBCUTANEOUS at 20:28

## 2021-06-05 RX ADMIN — SODIUM CHLORIDE 2000 ML: 9 INJECTION, SOLUTION INTRAVENOUS at 11:58

## 2021-06-05 RX ADMIN — NOREPINEPHRINE BITARTRATE 2 MCG/MIN: 1 INJECTION INTRAVENOUS at 05:57

## 2021-06-05 RX ADMIN — ONDANSETRON 4 MG: 2 INJECTION INTRAMUSCULAR; INTRAVENOUS at 17:30

## 2021-06-05 RX ADMIN — HYDROMORPHONE HYDROCHLORIDE 1 MG: 1 INJECTION, SOLUTION INTRAMUSCULAR; INTRAVENOUS; SUBCUTANEOUS at 14:55

## 2021-06-05 RX ADMIN — PIPERACILLIN AND TAZOBACTAM 4.5 G: 4; .5 INJECTION, POWDER, LYOPHILIZED, FOR SOLUTION INTRAVENOUS; PARENTERAL at 17:23

## 2021-06-05 RX ADMIN — PANTOPRAZOLE SODIUM 40 MG: 40 INJECTION, POWDER, LYOPHILIZED, FOR SOLUTION INTRAVENOUS at 09:08

## 2021-06-05 RX ADMIN — SODIUM CHLORIDE 1000 ML: 9 INJECTION, SOLUTION INTRAVENOUS at 05:56

## 2021-06-05 RX ADMIN — ONDANSETRON 4 MG: 2 INJECTION INTRAMUSCULAR; INTRAVENOUS at 12:14

## 2021-06-05 RX ADMIN — HEPARIN SODIUM 5000 UNITS: 5000 INJECTION, SOLUTION INTRAVENOUS; SUBCUTANEOUS at 09:08

## 2021-06-05 RX ADMIN — ACETAMINOPHEN 650 MG: 325 TABLET, FILM COATED ORAL at 09:41

## 2021-06-05 RX ADMIN — HYDROMORPHONE HYDROCHLORIDE 1 MG: 1 INJECTION, SOLUTION INTRAMUSCULAR; INTRAVENOUS; SUBCUTANEOUS at 17:30

## 2021-06-05 RX ADMIN — HYDROMORPHONE HYDROCHLORIDE 1 MG: 1 INJECTION, SOLUTION INTRAMUSCULAR; INTRAVENOUS; SUBCUTANEOUS at 09:00

## 2021-06-05 RX ADMIN — HYDROMORPHONE HYDROCHLORIDE 1 MG: 1 INJECTION, SOLUTION INTRAMUSCULAR; INTRAVENOUS; SUBCUTANEOUS at 11:43

## 2021-06-05 RX ADMIN — HEPARIN SODIUM 5000 UNITS: 5000 INJECTION, SOLUTION INTRAVENOUS; SUBCUTANEOUS at 22:35

## 2021-06-05 RX ADMIN — HEPARIN SODIUM 5000 UNITS: 5000 INJECTION, SOLUTION INTRAVENOUS; SUBCUTANEOUS at 14:55

## 2021-06-05 RX ADMIN — MAGNESIUM SULFATE 2 G: 2 INJECTION INTRAVENOUS at 17:22

## 2021-06-05 RX ADMIN — SODIUM CHLORIDE 1000 ML: 9 INJECTION, SOLUTION INTRAVENOUS at 07:30

## 2021-06-05 RX ADMIN — SODIUM CHLORIDE, POTASSIUM CHLORIDE, SODIUM LACTATE AND CALCIUM CHLORIDE: 600; 310; 30; 20 INJECTION, SOLUTION INTRAVENOUS at 17:23

## 2021-06-05 RX ADMIN — PIPERACILLIN AND TAZOBACTAM 4.5 G: 4; .5 INJECTION, POWDER, LYOPHILIZED, FOR SOLUTION INTRAVENOUS; PARENTERAL at 09:27

## 2021-06-05 RX ADMIN — PANTOPRAZOLE SODIUM 40 MG: 40 INJECTION, POWDER, LYOPHILIZED, FOR SOLUTION INTRAVENOUS at 17:30

## 2021-06-05 ASSESSMENT — LIFESTYLE VARIABLES
TOTAL SCORE: 0
TOTAL SCORE: 0
HOW MANY TIMES IN THE PAST YEAR HAVE YOU HAD 5 OR MORE DRINKS IN A DAY: 0
ON A TYPICAL DAY WHEN YOU DRINK ALCOHOL HOW MANY DRINKS DO YOU HAVE: 0
EVER HAD A DRINK FIRST THING IN THE MORNING TO STEADY YOUR NERVES TO GET RID OF A HANGOVER: NO
TOTAL SCORE: 0
EVER FELT BAD OR GUILTY ABOUT YOUR DRINKING: NO
HAVE YOU EVER FELT YOU SHOULD CUT DOWN ON YOUR DRINKING: NO
ALCOHOL_USE: NO
AVERAGE NUMBER OF DAYS PER WEEK YOU HAVE A DRINK CONTAINING ALCOHOL: 0
HAVE PEOPLE ANNOYED YOU BY CRITICIZING YOUR DRINKING: NO
CONSUMPTION TOTAL: NEGATIVE

## 2021-06-05 ASSESSMENT — PAIN DESCRIPTION - PAIN TYPE
TYPE: ACUTE PAIN

## 2021-06-05 ASSESSMENT — COGNITIVE AND FUNCTIONAL STATUS - GENERAL
MOBILITY SCORE: 24
SUGGESTED CMS G CODE MODIFIER MOBILITY: CH
SUGGESTED CMS G CODE MODIFIER DAILY ACTIVITY: CH
DAILY ACTIVITIY SCORE: 24

## 2021-06-05 ASSESSMENT — ENCOUNTER SYMPTOMS
FEVER: 0
HEADACHES: 0
SHORTNESS OF BREATH: 0
SINUS PAIN: 0
NERVOUS/ANXIOUS: 0
PALPITATIONS: 0
VOMITING: 1
CHILLS: 0
NAUSEA: 1
ABDOMINAL PAIN: 1
COUGH: 0
BACK PAIN: 0
DIARRHEA: 0
SEIZURES: 0

## 2021-06-05 ASSESSMENT — FIBROSIS 4 INDEX
FIB4 SCORE: 1.15

## 2021-06-05 NOTE — ASSESSMENT & PLAN NOTE
Creatinine 7.7. Pt appears dry on exam. Likely prerenal vs ATN from hypotension  Pt has poor appetite in the past week. N/V x1 day and diarrhea x 1 week  Had 2.5L fluid boluses at [facility]  Will give another 2L fluid boluses  Strict I/Os, monitor UOP goal >40cc/hr.   Check UA, culture if needed  No hydronephrosis bilaterally on my bedside US   If pt not making urine despite fluid boluses, low threshold for nephrology consult.

## 2021-06-05 NOTE — CONSULTS
[facility]PITAL[facility] TRIAGE OFFICER DIRECT ADMISSION REPORT    Transferring facility: [facility]  Transferring physician: Dr Malina Morris  Transferring facility/physician contact number:   Chief complaint: Septic shock, SHARIF  Pertinent history & patient course: 46yo male presented with abd pain, N/V, diarreha. BP i60-90s, -140s. Had 2.5L fluid boluses, started on NE gtt. On vancomycin and piptazo  Pertinent imaging & lab results: Lactate 3.3, creatinine 7.7, K normal. CT chest negative for dissection.   Code Status: not obtained  Further work up or recommendations per triage officer prior to transfer: none. ERP at [facility] will place a central line  Consultants called prior to transfer and pertinent input from consultants: none  Patient accepted for transfer: Yes  Consultants to be called upon arrival: Donato Poe DO  Admission status: Inpatient.   Floor requested: ICU  If ICU transfer, name of intensivist case discussed with and pertinent input from critical care: Donato Poe D.O.    Please inform the triage officer upon arrival of the patient to [facility] for assignment of a hospitalist to perform admission.     For any question or concerns regarding the care of this patient, please reach out to the assigned hospitalist.

## 2021-06-05 NOTE — PROGRESS NOTES
Pt continues to have minimal urine output via hong catheter despite a total of 4 liters normal saline.  Discussed during interdisciplinary rounds, will administer 2 more liters per order.  Pt has ongoing c/o epigastric pain, medicated with Dilaudid per order with fair results.  Discussed plan of care with patient and he expressed understanding, awaiting GI consult for further plan.

## 2021-06-05 NOTE — ED NOTES
Med rec complete per interview with pt at bedside and phone call with pt home pharmacy. Allergies reviewed. Pt denies antibiotic use in the past 14 days.

## 2021-06-05 NOTE — PROGRESS NOTES
Patient arrived to T636 by joy with careflight team. Norepinephrine infusing at 2 mcg/min. Patient AOx4. PERRLA. Moving all 4 extremities equally, 5/5 strength. Bilateral radial and pedal pulses 2+. No edema noted. 's, 's, spO2 97% on RA. Complains of 8/10 abdominal pain, was being treated with fentanyl pushes at outside facility. Dr. Poe at bedside.

## 2021-06-05 NOTE — ASSESSMENT & PLAN NOTE
This is Septic shock Present on admission  SIRS criteria identified on my evaluation include: Tachycardia, with heart rate greater than 90 BPM and Leukocytosis, with WBC greater than 12,000  Source is suspect GI  Presentation includes: Severe sepsis present and persistent hypotension after 30 ml/kg completed.   Despite appropriate fluid resuscitation with crystalloid given per sepsis guidelines, the patient remains hypotensive with systolic blood pressure less than 90 or MAP less than 65  Hemodynamic support with additional fluids and IV vasopressors as needed to maintain a SBP of 90 or MAP of 65  IV antibiotics as appropriate for source of sepsis  Reassessment: I have reassessed the patient's hemodynamic status    Suspect GI source due to epigastric pain.   Empiric antibiotics with piptazo  MRSA screen negative, discontinue vanco  Follow up blood cultures  Serial lactates  Keep MAP >65  Monitor UOP, strict I/Os. Place hong. Creatinine 7.7

## 2021-06-05 NOTE — H&P
Hospital Medicine History & Physical Note    Date of Service  6/5/2021    Primary Care Physician  LINA Penn.    Consultants  None    Code Status  Full Code    Chief Complaint  No chief complaint on file.      History of Presenting Illness  45 y.o. male, direct transfer from [facility] for septic shock and SHARIF. Pt reported that he had 1 day hx of abd pain, N/V. Pt also has watery diarrhea for 1 week. He was recently incarcerated yesterday evening at 6pm and around midnight he developed epigastric pain followed with vomiting x1, nonbloody. He was incarcerated for 3 days, no high-risk behavior, no sick contact. He was incarcerated multiple times for DUI (+marijuana per patient).     Pt denies any alcohol use. Hx of heavy alcohol use in the past and has quit for 11 months. Denies any IVDA.      At ED OSH, pt was given 2.5L fluid boluses, followed NE gtt at 5mcg/min. Started on vancomycin and piptazo. CT A/P was unremarkable. Not intubated.      Upon ICU arrival, pt is alert, oriented, on NE gtt at 2mcg/min. HR in 120s. On room air. Still having epigastric pain. Non radiating. Had cholecystectomy 1 year ago. Denies back pain, dysuria. Pt reported he hasn't been able to eat in the past week. No appetite.     Review of Systems  ROS  10+ systems reviewed and negative except as noted in HPI.      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    Surgical History   has a past  surgical history that includes anal fistulectomy; hiro by laparoscopy (2/22/2020); endoscopy procedure (N/A, 2/24/2020); toe amputation (Bilateral, 10/13/2020); and irrigation & debridement ortho (Right, 10/20/2020).     Family History  family history includes Lung Cancer in his father.     Social History   reports that he quit smoking about 3 years ago. His smoking use included cigarettes. He has a 20.00 pack-year smoking history. His smokeless tobacco use includes chew. He reports previous alcohol use of about 10.8 oz of alcohol per week. He reports current drug use. Drugs: Marijuana and Inhaled.    Allergies  No Known Allergies    Medications  Prior to Admission Medications   Prescriptions Last Dose Informant Patient Reported? Taking?   Alogliptin Benzoate 25 MG Tab   No No   Sig: Take 25 mg by mouth every day.   QUEtiapine (SEROQUEL) 25 MG Tab   No No   Sig: Take 1 tablet by mouth 2 times a day.   albuterol 108 (90 Base) MCG/ACT Aero Soln inhalation aerosol   No No   Sig: INHALE 2 PUFFS BY MOUTH EVERY 6 HOURS AS NEEDED FOR SHORTNESS OF BREATH   amitriptyline (ELAVIL) 25 MG Tab   No No   Sig: Take 1 tablet by mouth at bedtime as needed.   aspirin (ASA) 81 MG Chew Tab chewable tablet   Yes No   Sig: Take 1 Tab by mouth every day.   atorvastatin (LIPITOR) 40 MG Tab   No No   Sig: Take 1 tablet by mouth every day.   fluticasone (FLONASE) 50 MCG/ACT nasal spray   No No   Sig: Inhale 2 sprays into each nostril daily for allergies.   gabapentin (NEURONTIN) 300 MG Cap   No No   Sig: TAKE 1 CAPSULE BY MOUTH TWICE A DAY   hydrOXYzine HCl (ATARAX) 25 MG Tab   No No   Sig: One or two bid prn anxiety   Patient not taking: Reported on 5/18/2021   lisinopril (PRINIVIL) 10 MG Tab   No No   Sig: Take 1 tablet by mouth every day.   metformin (GLUCOPHAGE) 1000 MG tablet   No No   Sig: Take 1 tablet by mouth 2 times a day with meals. TAKE 1 TABLET BY MOUTH TWICE A DAY   metoprolol SR (TOPROL XL) 100 MG TABLET SR 24 HR   No No    Sig: Take 1 tablet by mouth every day.   omeprazole (PRILOSEC) 20 MG delayed-release capsule   No No   Sig: Take 1 capsule by mouth every day.   tamsulosin (FLOMAX) 0.4 MG capsule   No No   Sig: Take 1 capsule by mouth 1/2 hour after breakfast.      Facility-Administered Medications: None       Physical Exam  Temp:  [35.9 °C (96.6 °F)] 35.9 °C (96.6 °F)  Pulse:  [127] 127  Resp:  [14] 14  BP: (110)/(81) 110/81  SpO2:  [96 %] 96 %    Physical Exam  Physical Exam  Vitals and nursing note reviewed.  Constitutional:     General: Alert in no acute distress.    Appearance: Pt is not ill-appearing.     Comments:   HENT: No signs of trauma, Bilateral external ears normal, Nose normal.      Head: Normocephalic.     Mouth/Throat: Unremarkable. dryMucosa     Comments:   Eyes:      Pupils: Pupils are equal round and reactive to light, Conjunctiva normal, Non-icteric.   Neck: Normal range of motion, No tenderness, Supple, No stridor.   Cardiovascular:      Rate and Rhythm: Regular Rate and Rhythm     Heart sounds: No murmur, rubs, or gallops appreciated.  Pulmonary/Thorax & Lungs:      Effort: No respiratory distress.     Breath sounds: No stridor. No wheezing, No Rhonchi, no palpable chest tenderness     Comments:    Abdomen:     General: There is no distension.      Palpation/Auscultation: Soft, tenderness, epigastric pain, No masses, No pulsatile masses. Bowel sounds normal. No rebound/peritoneal signs. Negative Courtney sign.     Hernia: No hernia is appreciated at this time.   Skin: Warm, Dry, No erythema, No rash.       Coloration: Skin is not jaundiced or pale.   Back: No bony tenderness, No CVA tenderness.   Musculoskeletal:         General: No swelling or tenderness.   Extremities:       General: Intact distal pulses, No edema, No tenderness, No cyanosis      Vascular:   Neurologic/Psych: Alert, No focal deficits noted.       Comments:            Laboratory:  Recent Labs     06/05/21  0145   WBC 14.3*   RBC 4.60*    HEMOGLOBIN 13.5*   HEMATOCRIT 40.0*   MCV 87.0   MCH 29.3   MCHC 33.8   RDW 12.5   PLATELETCT 269   MPV 10.4     Recent Labs     06/05/21  0145   SODIUM 137   POTASSIUM 3.5   CHLORIDE 101   CO2 14*   GLUCOSE 112*   BUN 52*   CREATININE 7.7*   CALCIUM 9.5     Recent Labs     06/05/21  0145   ALTSGPT 47   ASTSGOT 46   ALKPHOSPHAT 114   TBILIRUBIN 0.5   LIPASE 176   GLUCOSE 112*         No results for input(s): NTPROBNP in the last 72 hours.      No results for input(s): TROPONINT in the last 72 hours.    Imaging:  No orders to display         Assessment/Plan:  I anticipate this patient will require at least two midnights for appropriate medical management, necessitating inpatient admission.    * Epigastric pain  Assessment & Plan  Unclear etiology.   CT A/P done at OSH was personally reviewed with our radiologist. No signs of bowel obstruction, aortic dissection, wall thickening of stomach, pancreatitis. No intraabdominal abscess noted.   DDx include PUD, esophagitis. Pt had similar pain when he had cholecysitis in the past and while he was drinking in the past  Gall bladder is absent, pt had cholecystectomy 1 year ago.   Lipase is normal. Pt denies active alcohol drink. Been sober for 11 months per patient.   LFT is normal  Denies IVDA  ECG normal sinus tach     Plan:  Keep NPO   Continue piptazo for empiric  Can discontinue vanco, MRSA screen is negative  Follow up blood cultures  Serial lactates  Check procalcitonin  Formal TTE. My bedside echo with grossly normal LVEF and RV size/function.   GI consult for possible EGD  Start protonix BID     SHARIF (acute kidney injury) (HCC)- (present on admission)  Assessment & Plan  Creatinine 7.7. Pt appears dry on exam. Likely prerenal vs ATN from hypotension  Pt has poor appetite in the past week. N/V x1 day and diarrhea x 1 week  Had 2.5L fluid boluses at OSh  Will give another 2L fluid boluses  Strict I/Os, monitor UOP goal >40cc/hr.   Check UA, culture if needed  No  hydronephrosis bilaterally on my bedside US   If pt not making urine despite fluid boluses, low threshold for nephrology consult.      Septic shock (HCC)  Assessment & Plan  This is Septic shock Present on admission  SIRS criteria identified on my evaluation include: Tachycardia, with heart rate greater than 90 BPM and Leukocytosis, with WBC greater than 12,000  Source is suspect GI  Presentation includes: Severe sepsis present and persistent hypotension after 30 ml/kg completed.   Despite appropriate fluid resuscitation with crystalloid given per sepsis guidelines, the patient remains hypotensive with systolic blood pressure less than 90 or MAP less than 65  Hemodynamic support with additional fluids and IV vasopressors as needed to maintain a SBP of 90 or MAP of 65  IV antibiotics as appropriate for source of sepsis  Reassessment: I have reassessed the patient's hemodynamic status     Suspect GI source due to epigastric pain.   Empiric antibiotics with piptazo  MRSA screen negative, discontinue vanco  Follow up blood cultures  Serial lactates  Keep MAP >65  Monitor UOP, strict I/Os. Place hong. Creatinine 7.7

## 2021-06-05 NOTE — PROGRESS NOTES
Pharmacy Vancomycin Kinetics Note for 2021     45 y.o. male on Vancomycin day # 1   Vancomycin Indication (Two level/Trough based Dosing): Sepsis (goal trough 15-20)    Provider specified end date:  TBD    Active Antibiotics (From admission, onward)    Ordered     Ordering Provider       Sat 2021  6:00 AM    21 0600  MD Alert...Vancomycin per Pharmacy  PHARMACY TO DOSE     Question:  Indication(s) for vancomycin?  Answer:  Unknown source of infection    Donato Poe D.O.    21 0600  piperacillin-tazobactam (ZOSYN) 4.5 g in  mL IVPB  (piperacillin-tazobactam (ZOSYN) IV (Extended-infusion) PANEL )  EVERY 12 HOURS      Donato Poe D.O.          Dosing Weight: 83.5 kg (184 lb 1.4 oz)      Admission History: Admitted on 2021 for Septic shock  Pertinent history: Transfer from [place] for sepsis requiring vasopressor support. Patient empirically started on broad spectrum antibiotics including vancomycin and Zosyn.    Allergies:     Patient has no known allergies.     Pertinent cultures to date:     Results     Procedure Component Value Units Date/Time    MRSA By PCR (Amp) [[id number]]     Order Status: No result Specimen: Respirate from Nares           Labs:     Estimated Creatinine Clearance: 13.5 mL/min (A) (by C-G formula based on SCr of 7.7 mg/dL (HH)).  Recent Labs     21  0145   WBC 14.3*     Recent Labs     21  0145   BUN 52*   CREATININE 7.7*   ALBUMIN 4.5     No intake or output data in the 24 hours ending 21 0605   /81   Pulse (!) 127   Temp 35.9 °C (96.6 °F) (Temporal)   Resp 14   Wt 83.5 kg (184 lb 1.4 oz)   SpO2 96%  Temp (24hrs), Av.9 °C (96.6 °F), Min:35.9 °C (96.6 °F), Max:35.9 °C (96.6 °F)      List concerns for Vancomycin clearance:            Trough kinetics:   No results for input(s): VANCOTROUGH, VANCOPEAK, VANCORANDOM in the last 72 hours.    A/P:     -  Vancomycin dose: 1250 mg load    -  Next vancomycin level(s):    -VR  @ 1600 (12h  post dose)      -  Comments: Patient given 1250mg vancomycin PTA. Due to SHARIF and poor ability to clear drug, no further dosing will be given until clearance is assessed. A 12h level has been ordered for today to assess. Please continue to monitor renal function, urine output and vancomycin levels for dosing adjustments. Please also follow cultures and signs of clinical cure for de-escalation of therapy.       Vanessa Martinez, PharmD

## 2021-06-05 NOTE — PROGRESS NOTES
4 Eyes Skin Assessment Completed by Sarah Beth, RN and JOHANA Ruiz.    Head WDL  Ears WDL  Nose WDL  Mouth WDL  Neck WDL  Breast/Chest WDL  Shoulder Blades WDL  Spine WDL  (R) Arm/Elbow/Hand WDL  (L) Arm/Elbow/Hand WDL  Abdomen WDL  Groin WDL  Scrotum/Coccyx/Buttocks WDL  (R) Leg WDL  (L) Leg WDL  (R) Heel/Foot/Toe Redness and Blanching  (L) Heel/Foot/Toe Redness and Blanching          Devices In Places ECG, Blood Pressure Cuff, Pulse Ox, Gerardo and Central Line      Interventions In Place Pillows and Q2 Turns    Possible Skin Injury No    Pictures Uploaded Into Epic Yes, Picture of toes uploaded. Red and blanching.   Wound Consult Placed N/A  RN Wound Prevention Protocol Ordered Yes

## 2021-06-05 NOTE — PROGRESS NOTES
Critical Care Progress Note    Date of admission  6/5/2021    Chief Complaint  45 y.o. male admitted 6/5/2021 with severe dehydration, hypotension, SHARIF, epigastric pain    Hospital Course  45 y.o. male, direct transfer from [facility] for septic shock and SHARIF. Pt reported that he had 1 day hx of abd pain, N/V. Pt also has watery diarrhea for 1 week. He was recently incarcerated yesterday evening at 6pm and around midnight he developed epigastric pain followed with vomiting x1, nonbloody. He was incarcerated for 3 days, no high-risk behavior, no sick contact. He was incarcerated multiple times for DUI (+marijuana per patient).   Pt denies any alcohol use. Hx of heavy alcohol use in the past and has quit for 11 months. Denies any IVDA.   At ED OSH, pt was given 2.5L fluid boluses, followed NE gtt at 5mcg/min. Started on vancomycin and piptazo. CT A/P was unremarkable. Not intubated.   Upon ICU arrival, pt is alert, oriented, on NE gtt at 2mcg/min. HR in 120s. On room air. Still having epigastric pain. Non radiating. Had cholecystectomy 1 year ago. Denies back pain, dysuria. Pt reported he hasn't been able to eat in the past week. No appetite.     Interval Problem Update  Reviewed last 24 hour events:  A/o x 4  Epigastric pain  SR/ST  's, off norepi  NPO  Small BM  I/O = 4L in so far, min o/p  Room air  IV PPI BID  Zosyn  NS 2 more liters      Review of Systems  Review of Systems   Unable to perform ROS: Acuity of condition        Vital Signs for last 24 hours   Temp:  [35.9 °C (96.6 °F)] 35.9 °C (96.6 °F)  Pulse:  [127] 127  Resp:  [14] 14  BP: (110)/(81) 110/81  SpO2:  [96 %] 96 %    Hemodynamic parameters for last 24 hours       Respiratory Information for the last 24 hours       Physical Exam   Physical Exam  Vitals and nursing note reviewed.   Constitutional:       General: He is not in acute distress.     Appearance: He is not ill-appearing, toxic-appearing or diaphoretic.   HENT:      Head:  Normocephalic and atraumatic.      Mouth/Throat:      Mouth: Mucous membranes are dry.      Pharynx: Oropharynx is clear.   Cardiovascular:      Rate and Rhythm: Normal rate and regular rhythm.      Pulses: Normal pulses.      Heart sounds: Normal heart sounds. No murmur heard.     Pulmonary:      Effort: Pulmonary effort is normal. No respiratory distress.      Breath sounds: Normal breath sounds. No wheezing, rhonchi or rales.   Abdominal:      General: There is no distension.      Palpations: Abdomen is soft. There is no mass.      Tenderness: There is abdominal tenderness.      Hernia: No hernia is present.      Comments: Epigastric pain    Musculoskeletal:         General: No swelling or tenderness.      Right lower leg: No edema.      Left lower leg: No edema.   Skin:     General: Skin is warm and dry.      Capillary Refill: Capillary refill takes less than 2 seconds.      Coloration: Skin is not jaundiced.   Neurological:      General: No focal deficit present.      Mental Status: He is alert and oriented to person, place, and time.      Cranial Nerves: No cranial nerve deficit.   Psychiatric:         Mood and Affect: Mood normal.         Medications  Current Facility-Administered Medications   Medication Dose Route Frequency Provider Last Rate Last Admin   • Respiratory Therapy Consult   Nebulization Continuous RT Donato Poe D.O.       • norepinephrine (Levophed) 8 mg in 250 mL NS infusion (premix)  0-30 mcg/min Intravenous Continuous FARHAT Bee.TIFFANY 1.9 mL/hr at 06/05/21 0620 1 mcg/min at 06/05/21 0620   • LIDOCAINE HCL 1 % INJ SOLN            • piperacillin-tazobactam (ZOSYN) 4.5 g in  mL IVPB  4.5 g Intravenous Q12HRS Donato Poe D.O.       • heparin injection 5,000 Units  5,000 Units Subcutaneous Q8HRS Ger Hare M.D.       • acetaminophen (Tylenol) tablet 650 mg  650 mg Oral Q6HRS PRN Ger Hare M.D.       • ondansetron (ZOFRAN) syringe/vial injection 4 mg  4 mg Intravenous Q4HRS  PRN Ger Hare M.D.       • ondansetron (ZOFRAN ODT) dispertab 4 mg  4 mg Oral Q4HRS PRN Ger Hare M.D.       • promethazine (PHENERGAN) tablet 12.5-25 mg  12.5-25 mg Oral Q4HRS PRN Ger Hare M.D.       • promethazine (PHENERGAN) suppository 12.5-25 mg  12.5-25 mg Rectal Q4HRS PRN Ger Hare M.D.       • prochlorperazine (COMPAZINE) injection 5-10 mg  5-10 mg Intravenous Q4HRS PRN Ger Hare M.D.       • NS (BOLUS) infusion 2,000 mL  2,000 mL Intravenous Once Donato Poe, D.O.       • pantoprazole (PROTONIX) injection 40 mg  40 mg Intravenous BID Donato Poe D.O.           Fluids  No intake or output data in the 24 hours ending 06/05/21 0729    Laboratory      Recent Labs     06/05/21 0145   TROPONINI <0.01     Recent Labs     06/05/21 0145 06/05/21 0602   SODIUM 137 134*   POTASSIUM 3.5 3.7   CHLORIDE 101 103   CO2 14* 15*   BUN 52* 44*   CREATININE 7.7* 6.68*   CALCIUM 9.5 7.7*     Recent Labs     06/05/21 0145 06/05/21  0602   ALTSGPT 47  --    ASTSGOT 46  --    ALKPHOSPHAT 114  --    TBILIRUBIN 0.5  --    AMYLASE  --  57   LIPASE 176 41   GLUCOSE 112* 139*     Recent Labs     06/05/21 0145   WBC 14.3*   ASTSGOT 46   ALTSGPT 47   ALKPHOSPHAT 114   TBILIRUBIN 0.5     Recent Labs     06/05/21 0145 06/05/21 0602   RBC 4.60*  --    HEMOGLOBIN 13.5*  --    HEMATOCRIT 40.0*  --    PLATELETCT 269  --    PROTHROMBTM  --  14.3   INR  --  1.07       Imaging  X-Ray:  I have personally reviewed the images and compared with prior images.    Assessment/Plan  * Epigastric pain  Assessment & Plan  Unclear etiology.   CT A/P done at OSH was personally reviewed with our radiologist. No signs of bowel obstruction, aortic dissection, wall thickening of stomach, pancreatitis. No intraabdominal abscess noted.   DDx include PUD, esophagitis. Pt had similar pain when he had cholecysitis in the past and while he was drinking in the past  Gall bladder is absent, pt had cholecystectomy 1 year ago.    Lipase is normal. Pt denies active alcohol drink. Been sober for 11 months per patient.   LFT is normal  Denies IVDA  ECG normal sinus tach    Plan:  Keep NPO   Continue piptazo for empiric  Can discontinue vanco, MRSA screen is negative  Follow up blood cultures  Serial lactates  Check procalcitonin  Formal TTE. My bedside echo with grossly normal LVEF and RV size/function.   GI consult for possible EGD  Start protonix BID    SHARIF (acute kidney injury) (HCC)- (present on admission)  Assessment & Plan  Creatinine 7.7. Pt appears dry on exam. Likely prerenal vs ATN from hypotension  Pt has poor appetite in the past week. N/V x1 day and diarrhea x 1 week  Had 2.5L fluid boluses at OSh  Will give another 2L fluid boluses  Strict I/Os, monitor UOP goal >40cc/hr.   Check UA, culture if needed  No hydronephrosis bilaterally on my bedside US   If pt not making urine despite fluid boluses, low threshold for nephrology consult.     Septic shock (HCC)  Assessment & Plan  This is Septic shock Present on admission  SIRS criteria identified on my evaluation include: Tachycardia, with heart rate greater than 90 BPM and Leukocytosis, with WBC greater than 12,000  Source is suspect GI  Presentation includes: Severe sepsis present and persistent hypotension after 30 ml/kg completed.   Despite appropriate fluid resuscitation with crystalloid given per sepsis guidelines, the patient remains hypotensive with systolic blood pressure less than 90 or MAP less than 65  Hemodynamic support with additional fluids and IV vasopressors as needed to maintain a SBP of 90 or MAP of 65  IV antibiotics as appropriate for source of sepsis  Reassessment: I have reassessed the patient's hemodynamic status    Suspect GI source due to epigastric pain.   Empiric antibiotics with piptazo  MRSA screen negative, discontinue vanco  Follow up blood cultures  Serial lactates  Keep MAP >65  Monitor UOP, strict I/Os. Place hong. Creatinine 7.7       Update:  Patient clinically improving but some ongoing epigastric pain  Continue antibiotics  Continue Zosyn  IV PPI  Additional crystalloid volume resuscitation, urine output starting to   Follow closely in ICU, may require GI consultation for EGD    VTE:  Heparin  Ulcer: PPI  Lines: None    I have performed a physical exam and reviewed and updated ROS and Plan today (6/5/2021). In review of yesterday's note (6/4/2021), there are no changes except as documented above.     Discussed patient condition and risk of morbidity and/or mortality with RN, RT, Pharmacy and QA team  The patient remains critically ill.  Critical care time = 30 minutes in directly providing and coordinating critical care and extensive data review.  No time overlap and excludes procedures.

## 2021-06-05 NOTE — CONSULTS
Critical Care Consultation/ H&P    Date of consult: 6/5/2021    Referring Physician  Dr. Malina Morris from [facility]    Reason for Consultation  Septic Shock, direct transfer from [facility]    History of Presenting Illness  45 y.o. male, direct transfer from [facility] for septic shock and SHARIF. Pt reported that he had 1 day hx of abd pain, N/V. Pt also has watery diarrhea for 1 week. He was recently incarcerated yesterday evening at 6pm and around midnight he developed epigastric pain followed with vomiting x1, nonbloody. He was incarcerated for 3 days, no high-risk behavior, no sick contact. He was incarcerated multiple times for DUI (+marijuana per patient).     Pt denies any alcohol use. Hx of heavy alcohol use in the past and has quit for 11 months. Denies any IVDA.     At ED OSH, pt was given 2.5L fluid boluses, followed NE gtt at 5mcg/min. Started on vancomycin and piptazo. CT A/P was unremarkable. Not intubated.     Upon ICU arrival, pt is alert, oriented, on NE gtt at 2mcg/min. HR in 120s. On room air. Still having epigastric pain. Non radiating. Had cholecystectomy 1 year ago. Denies back pain, dysuria. Pt reported he hasn't been able to eat in the past week. No appetite.     Code Status  Full Code    Review of Systems  Review of Systems   Constitutional: Negative for chills, fever and malaise/fatigue.   HENT: Negative for congestion and sinus pain.    Respiratory: Negative for cough and shortness of breath.    Cardiovascular: Negative for chest pain, palpitations and leg swelling.   Gastrointestinal: Positive for abdominal pain (epigastric area), nausea (currently not nauseated) and vomiting. Negative for diarrhea.   Genitourinary: Negative for dysuria.   Musculoskeletal: Negative for back pain.   Skin: Negative for rash.   Neurological: Negative for seizures and headaches.   Psychiatric/Behavioral: The patient is not nervous/anxious.    All other systems reviewed and are negative.      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 He also has no past medical history of Addisons disease ([facility]), Adrenal disorder (HCC), Allergy, Arrhythmia, Arthritis, Blood transfusion without reported diagnosis, Cancer ([facility]), Cataract, CHF (congestive heart failure) ([facility]), Clotting disorder (HCC), COPD (chronic obstructive pulmonary disease) (HCC), Cushings syndrome (HCC), Depression, GERD (gastroesophageal reflux disease), Glaucoma, Goiter, Head ache, Heart attack (HCC), Heart murmur, HIV (human immunodeficiency virus infection) (HCC), Hyperlipidemia, IBD (inflammatory bowel disease), Meningitis, Migraine, Osteoporosis, Parathyroid disorder (HCC), Pituitary disease (HCC), Pulmonary emphysema (HCC), Seizure (HCC), Sickle cell disease (HCC), Stroke (HCC), Thyroid disease, or Tuberculosis.    Surgical History   has a past surgical history that includes anal fistulectomy; hiro by laparoscopy (2/22/2020); endoscopy procedure (N/A, 2/24/2020); toe amputation (Bilateral, 10/13/2020); and irrigation & debridement ortho (Right, 10/20/2020).    Family History  family history includes Lung Cancer in his father.    Social History   reports that he quit smoking about 3 years ago. His smoking use included cigarettes. He has a 20.00 pack-year smoking history. His smokeless tobacco use includes chew. He reports previous alcohol use of about 10.8 oz of alcohol per week. He reports current drug use. Drugs:  Marijuana and Inhaled.    Medications  Home Medications    **Home medications have not yet been reviewed for this encounter**       Current Facility-Administered Medications   Medication Dose Route Frequency Provider Last Rate Last Admin   • senna-docusate (PERICOLACE or SENOKOT S) 8.6-50 MG per tablet 2 tablet  2 tablet Oral BID Donato Poe D.O.        And   • polyethylene glycol/lytes (MIRALAX) PACKET 1 Packet  1 Packet Oral QDAY PRN Donato Poe D.O.        And   • magnesium hydroxide (MILK OF MAGNESIA) suspension 30 mL  30 mL Oral QDAY PRN FARHAT Bee.O.        And   • bisacodyl (DULCOLAX) suppository 10 mg  10 mg Rectal QDAY PRN FARHAT Bee.BRYCE.       • Respiratory Therapy Consult   Nebulization Continuous RT Donato Poe D.O.       • enoxaparin (LOVENOX) inj 40 mg  40 mg Subcutaneous DAILY Donato Poe D.O.       • norepinephrine (Levophed) 8 mg in 250 mL NS infusion (premix)  0-30 mcg/min Intravenous Continuous Donato Poe D.O.           Allergies  No Known Allergies    Vital Signs last 24 hours       Physical Exam  Physical Exam  Vitals and nursing note reviewed.   Constitutional:       General: He is not in acute distress.     Appearance: He is not ill-appearing, toxic-appearing or diaphoretic.   HENT:      Head: Normocephalic and atraumatic.      Mouth/Throat:      Mouth: Mucous membranes are dry.      Pharynx: Oropharynx is clear.   Cardiovascular:      Rate and Rhythm: Normal rate and regular rhythm.      Pulses: Normal pulses.      Heart sounds: Normal heart sounds. No murmur heard.     Pulmonary:      Effort: Pulmonary effort is normal. No respiratory distress.      Breath sounds: Normal breath sounds. No wheezing, rhonchi or rales.   Abdominal:      General: There is no distension.      Palpations: Abdomen is soft. There is no mass.      Tenderness: There is abdominal tenderness.      Hernia: No hernia is present.      Comments: Epigastric pain    Musculoskeletal:         General: No swelling or  tenderness.      Right lower leg: No edema.      Left lower leg: No edema.   Skin:     General: Skin is warm and dry.      Capillary Refill: Capillary refill takes less than 2 seconds.      Coloration: Skin is not jaundiced.   Neurological:      General: No focal deficit present.      Mental Status: He is alert and oriented to person, place, and time.      Cranial Nerves: No cranial nerve deficit.   Psychiatric:         Mood and Affect: Mood normal.         Fluids  No intake or output data in the 24 hours ending 06/05/21 0548    Laboratory  Recent Results (from the past 48 hour(s))   CBC WITH DIFFERENTIAL    Collection Time: 06/05/21  1:45 AM   Result Value Ref Range    WBC 14.3 (H) 4.8 - 10.8 K/uL    RBC 4.60 (L) 4.70 - 6.10 M/uL    Hemoglobin 13.5 (L) 14.0 - 18.0 g/dL    Hematocrit 40.0 (L) 42.0 - 52.0 %    MCV 87.0 80.0 - 94.0 fL    MCH 29.3 28.7 - 33.1 pg    MCHC 33.8 33.0 - 37.0 g/dL    RDW 12.5 11.5 - 14.5 %    Platelet Count 269 130 - 400 K/uL    MPV 10.4 7.4 - 10.4 fL    Neutrophils Automated 65.8 39.0 - 70.0 %    Lymphocytes Automated 20.0 (L) 21.0 - 50.0 %    Monocytes Automated 10.3 (H) 1.7 - 10.0 %    Eosinophils Automated 3.3 0.0 - 5.0 %    Basophils Automated 0.6 0.0 - 3.0 %    Abs Neutrophils Automated 9.4 (H) 1.8 - 7.7 K/uL    Abs Lymph Automated 2.9 1.2 - 4.8 K/uL    Monos (Absolute) 1.5 (H) 0.2 - 0.9 K/uL   COMP METABOLIC PANEL    Collection Time: 06/05/21  1:45 AM   Result Value Ref Range    Sodium 137 137 - 145 mmol/L    Potassium 3.5 3.5 - 5.1 mmol/L    Chloride 101 98 - 107 mmol/L    Co2 14 (L) 22 - 30 mmol/L    Anion Gap 22 (H) 4 - 12 mmol/L    Glucose 112 (H) 70 - 100 mg/dL    Bun 52 (H) 9 - 20 mg/dL    Creatinine 7.7 (HH) 0.7 - 1.3 mg/dL    Calcium 9.5 8.7 - 10.5 mg/dL    AST(SGOT) 46 15 - 46 U/L    ALT(SGPT) 47 13 - 69 U/L    Alkaline Phosphatase 114 38 - 126 U/L    Total Bilirubin 0.5 0.2 - 1.3 mg/dL    Albumin 4.5 3.5 - 5.0 g/dL    Total Protein 8.2 6.3 - 8.2 g/dL    A-G Ratio 1.2     LIPASE    Collection Time: 06/05/21  1:45 AM   Result Value Ref Range    Lipase 176 23 - 300 U/L   TROPONIN    Collection Time: 06/05/21  1:45 AM   Result Value Ref Range    Troponin I <0.01 0.00 - 0.04 ng/mL   LACTIC ACID    Collection Time: 06/05/21  1:45 AM   Result Value Ref Range    Lactic Acid 3.3 (H) 0.0 - 2.0 mmol/L   ESTIMATED GFR    Collection Time: 06/05/21  1:45 AM   Result Value Ref Range    GFR If  9 (A) >60 mL/min/1.73 m 2    GFR If Non  8 (A) >60 mL/min/1.73 m 2   VENOUS BLOOD GAS    Collection Time: 06/05/21  1:45 AM   Result Value Ref Range    Venous Bg Ph 7.30 (L) 7.35 - 7.45    Venous Bg Pco2 28.8 (L) 36.0 - 48.0 mmHg    Venous Bg Po2 34.8 mmHg    Venous Bg Hco3 13.9 (L) 24.0 - 28.0 mmol/L    Venous Bg Base Excess -10.9 (L) -3.0 - 3.0 mmol/L   POC GLUCOSE    Collection Time: 06/05/21  1:49 AM   Result Value Ref Range    Glucose - Accu-Ck 98 70 - 100 mg/dL   COVID/SARS CoV-2 PCR RAPID    Collection Time: 06/05/21  2:00 AM    Specimen: Nasopharyngeal; Respirate   Result Value Ref Range    COVID Order Status See below    MRSA SURVEILLANCE    Collection Time: 06/05/21  2:00 AM   Result Value Ref Range    Mrsa Screen Negative    CoV-2, Flu A/B, And RSV by PCR    Collection Time: 06/05/21  2:00 AM   Result Value Ref Range    SARS-CoV-2 by PCR Negative Negative    SARS-CoV-2 Source NP Swab    ABO AND RH DETERMINATION    Collection Time: 06/05/21  2:30 AM   Result Value Ref Range    ABO Grouping Only B     Rh Grouping Only Positive    ANTIBODY SCREEN    Collection Time: 06/05/21  2:30 AM   Result Value Ref Range    Antibody Screen Scrn Negative        Imaging  No orders to display       Assessment/Plan  * Epigastric pain  Assessment & Plan  Unclear etiology.   CT A/P done at OSH was personally reviewed with our radiologist. No signs of bowel obstruction, aortic dissection, wall thickening of stomach, pancreatitis. No intraabdominal abscess noted.   DDx include PUD,  esophagitis. Pt had similar pain when he had cholecysitis in the past and while he was drinking in the past  Gall bladder is absent, pt had cholecystectomy 1 year ago.   Lipase is normal. Pt denies active alcohol drink. Been sober for 11 months per patient.   LFT is normal  Denies IVDA  ECG normal sinus tach    Plan:  Keep NPO   Continue piptazo for empiric  Can discontinue vanco, MRSA screen is negative  Follow up blood cultures  Serial lactates  Check procalcitonin  Formal TTE. My bedside echo with grossly normal LVEF and RV size/function.   GI consult for possible EGD  Start protonix BID    SHARIF (acute kidney injury) (HCC)- (present on admission)  Assessment & Plan  Creatinine 7.7. Pt appears dry on exam. Likely prerenal vs ATN from hypotension  Pt has poor appetite in the past week. N/V x1 day and diarrhea x 1 week  Had 2.5L fluid boluses at OSh  Will give another 2L fluid boluses  Strict I/Os, monitor UOP goal >40cc/hr.   Check UA, culture if needed  No hydronephrosis bilaterally on my bedside US   If pt not making urine despite fluid boluses, low threshold for nephrology consult.     Septic shock ([facility])  Assessment & Plan  This is Septic shock Present on admission  SIRS criteria identified on my evaluation include: Tachycardia, with heart rate greater than 90 BPM and Leukocytosis, with WBC greater than 12,000  Source is suspect GI  Presentation includes: Severe sepsis present and persistent hypotension after 30 ml/kg completed.   Despite appropriate fluid resuscitation with crystalloid given per sepsis guidelines, the patient remains hypotensive with systolic blood pressure less than 90 or MAP less than 65  Hemodynamic support with additional fluids and IV vasopressors as needed to maintain a SBP of 90 or MAP of 65  IV antibiotics as appropriate for source of sepsis  Reassessment: I have reassessed the patient's hemodynamic status    Suspect GI source due to epigastric pain.   Empiric antibiotics with  piptazo  MRSA screen negative, discontinue vanco  Follow up blood cultures  Serial lactates  Keep MAP >65  Monitor UOP, strict I/Os. Place hong. Creatinine 7.7      Discussed patient condition and risk of morbidity and/or mortality with RN, RT, Pharmacy and Patient.    The patient remains critically ill.  Critical care time = 60 minutes in directly providing and coordinating critical care and extensive data review.  No time overlap and excludes procedures.

## 2021-06-05 NOTE — PROGRESS NOTES
Pt with emesis of clear green tinged output, medicated with zofran per order.  Urine output remains low despite further saline administration.

## 2021-06-05 NOTE — ASSESSMENT & PLAN NOTE
Unclear etiology.   CT A/P done at OSH was personally reviewed with our radiologist. No signs of bowel obstruction, aortic dissection, wall thickening of stomach, pancreatitis. No intraabdominal abscess noted.   DDx include PUD, esophagitis. Pt had similar pain when he had cholecysitis in the past and while he was drinking in the past  Gall bladder is absent, pt had cholecystectomy 1 year ago.   Lipase is normal. Pt denies active alcohol drink. Been sober for 11 months per patient.   LFT is normal  Denies IVDA  ECG normal sinus tach    Plan:  Keep NPO   Continue piptazo for empiric  Can discontinue vanco, MRSA screen is negative  Follow up blood cultures  Serial lactates  Check procalcitonin  Formal TTE. My bedside echo with grossly normal LVEF and RV size/function.   GI consult for possible EGD  Start protonix BID

## 2021-06-05 NOTE — PROGRESS NOTES
Patient requested RN update his mother that he has been admitted. Mother, Dalia, updated that patient has been admitted . All questions answered and Dalia was given information on how to contact the hospital for updates.

## 2021-06-06 PROBLEM — R65.21 SEPTIC SHOCK (HCC): Status: RESOLVED | Noted: 2020-10-11 | Resolved: 2021-06-06

## 2021-06-06 PROBLEM — A41.9 SEPTIC SHOCK (HCC): Status: RESOLVED | Noted: 2020-10-11 | Resolved: 2021-06-06

## 2021-06-06 LAB
ALBUMIN SERPL BCP-MCNC: 3.2 G/DL (ref 3.2–4.9)
ALBUMIN/GLOB SERPL: 1 G/DL
ALP SERPL-CCNC: 88 U/L (ref 30–99)
ALT SERPL-CCNC: 29 U/L (ref 2–50)
ANION GAP SERPL CALC-SCNC: 14 MMOL/L (ref 7–16)
AST SERPL-CCNC: 28 U/L (ref 12–45)
BASOPHILS # BLD AUTO: 0.5 % (ref 0–1.8)
BASOPHILS # BLD: 0.04 K/UL (ref 0–0.12)
BILIRUB SERPL-MCNC: 0.4 MG/DL (ref 0.1–1.5)
BUN SERPL-MCNC: 44 MG/DL (ref 8–22)
CA-I SERPL-SCNC: 1.1 MMOL/L (ref 1.1–1.3)
CALCIUM SERPL-MCNC: 8.1 MG/DL (ref 8.5–10.5)
CHLORIDE SERPL-SCNC: 109 MMOL/L (ref 96–112)
CHOLEST SERPL-MCNC: 105 MG/DL (ref 100–199)
CO2 SERPL-SCNC: 15 MMOL/L (ref 20–33)
CREAT SERPL-MCNC: 6.01 MG/DL (ref 0.5–1.4)
EOSINOPHIL # BLD AUTO: 0.35 K/UL (ref 0–0.51)
EOSINOPHIL NFR BLD: 4.5 % (ref 0–6.9)
ERYTHROCYTE [DISTWIDTH] IN BLOOD BY AUTOMATED COUNT: 42.9 FL (ref 35.9–50)
GLOBULIN SER CALC-MCNC: 3.2 G/DL (ref 1.9–3.5)
GLUCOSE BLD-MCNC: 154 MG/DL (ref 65–99)
GLUCOSE SERPL-MCNC: 77 MG/DL (ref 65–99)
HCT VFR BLD AUTO: 35.9 % (ref 42–52)
HDLC SERPL-MCNC: 36 MG/DL
HGB BLD-MCNC: 11.6 G/DL (ref 14–18)
IMM GRANULOCYTES # BLD AUTO: 0.03 K/UL (ref 0–0.11)
IMM GRANULOCYTES NFR BLD AUTO: 0.4 % (ref 0–0.9)
LDLC SERPL CALC-MCNC: 42 MG/DL
LYMPHOCYTES # BLD AUTO: 1.59 K/UL (ref 1–4.8)
LYMPHOCYTES NFR BLD: 20.2 % (ref 22–41)
MAGNESIUM SERPL-MCNC: 1.8 MG/DL (ref 1.5–2.5)
MCH RBC QN AUTO: 29.5 PG (ref 27–33)
MCHC RBC AUTO-ENTMCNC: 32.3 G/DL (ref 33.7–35.3)
MCV RBC AUTO: 91.3 FL (ref 81.4–97.8)
MONOCYTES # BLD AUTO: 0.69 K/UL (ref 0–0.85)
MONOCYTES NFR BLD AUTO: 8.8 % (ref 0–13.4)
NEUTROPHILS # BLD AUTO: 5.16 K/UL (ref 1.82–7.42)
NEUTROPHILS NFR BLD: 65.6 % (ref 44–72)
NRBC # BLD AUTO: 0 K/UL
NRBC BLD-RTO: 0 /100 WBC
PHOSPHATE SERPL-MCNC: 5.6 MG/DL (ref 2.5–4.5)
PLATELET # BLD AUTO: 163 K/UL (ref 164–446)
PMV BLD AUTO: 10.4 FL (ref 9–12.9)
POTASSIUM SERPL-SCNC: 3.4 MMOL/L (ref 3.6–5.5)
PROT SERPL-MCNC: 6.4 G/DL (ref 6–8.2)
RBC # BLD AUTO: 3.93 M/UL (ref 4.7–6.1)
SODIUM SERPL-SCNC: 138 MMOL/L (ref 135–145)
TRIGL SERPL-MCNC: 136 MG/DL (ref 0–149)
WBC # BLD AUTO: 7.9 K/UL (ref 4.8–10.8)

## 2021-06-06 PROCEDURE — 700102 HCHG RX REV CODE 250 W/ 637 OVERRIDE(OP): Performed by: HOSPITALIST

## 2021-06-06 PROCEDURE — 770001 HCHG ROOM/CARE - MED/SURG/GYN PRIV*

## 2021-06-06 PROCEDURE — 83735 ASSAY OF MAGNESIUM: CPT

## 2021-06-06 PROCEDURE — 99233 SBSQ HOSP IP/OBS HIGH 50: CPT | Performed by: HOSPITALIST

## 2021-06-06 PROCEDURE — 82330 ASSAY OF CALCIUM: CPT

## 2021-06-06 PROCEDURE — 700105 HCHG RX REV CODE 258: Performed by: INTERNAL MEDICINE

## 2021-06-06 PROCEDURE — 85025 COMPLETE CBC W/AUTO DIFF WBC: CPT

## 2021-06-06 PROCEDURE — 700111 HCHG RX REV CODE 636 W/ 250 OVERRIDE (IP): Performed by: INTERNAL MEDICINE

## 2021-06-06 PROCEDURE — C9113 INJ PANTOPRAZOLE SODIUM, VIA: HCPCS | Performed by: INTERNAL MEDICINE

## 2021-06-06 PROCEDURE — 700111 HCHG RX REV CODE 636 W/ 250 OVERRIDE (IP): Performed by: STUDENT IN AN ORGANIZED HEALTH CARE EDUCATION/TRAINING PROGRAM

## 2021-06-06 PROCEDURE — 700102 HCHG RX REV CODE 250 W/ 637 OVERRIDE(OP): Performed by: INTERNAL MEDICINE

## 2021-06-06 PROCEDURE — A9270 NON-COVERED ITEM OR SERVICE: HCPCS | Performed by: INTERNAL MEDICINE

## 2021-06-06 PROCEDURE — 80053 COMPREHEN METABOLIC PANEL: CPT

## 2021-06-06 PROCEDURE — 700102 HCHG RX REV CODE 250 W/ 637 OVERRIDE(OP): Performed by: STUDENT IN AN ORGANIZED HEALTH CARE EDUCATION/TRAINING PROGRAM

## 2021-06-06 PROCEDURE — A9270 NON-COVERED ITEM OR SERVICE: HCPCS | Performed by: STUDENT IN AN ORGANIZED HEALTH CARE EDUCATION/TRAINING PROGRAM

## 2021-06-06 PROCEDURE — 80061 LIPID PANEL: CPT

## 2021-06-06 PROCEDURE — A9270 NON-COVERED ITEM OR SERVICE: HCPCS | Performed by: HOSPITALIST

## 2021-06-06 PROCEDURE — 99233 SBSQ HOSP IP/OBS HIGH 50: CPT | Performed by: INTERNAL MEDICINE

## 2021-06-06 PROCEDURE — 84100 ASSAY OF PHOSPHORUS: CPT

## 2021-06-06 PROCEDURE — 82962 GLUCOSE BLOOD TEST: CPT

## 2021-06-06 RX ORDER — OXYCODONE HYDROCHLORIDE 10 MG/1
10 TABLET ORAL EVERY 4 HOURS PRN
Status: DISCONTINUED | OUTPATIENT
Start: 2021-06-06 | End: 2021-06-08 | Stop reason: HOSPADM

## 2021-06-06 RX ORDER — OXYCODONE HYDROCHLORIDE 5 MG/1
5 TABLET ORAL EVERY 4 HOURS PRN
Status: DISCONTINUED | OUTPATIENT
Start: 2021-06-06 | End: 2021-06-08 | Stop reason: HOSPADM

## 2021-06-06 RX ORDER — MAGNESIUM SULFATE HEPTAHYDRATE 40 MG/ML
2 INJECTION, SOLUTION INTRAVENOUS ONCE
Status: COMPLETED | OUTPATIENT
Start: 2021-06-06 | End: 2021-06-06

## 2021-06-06 RX ORDER — AMLODIPINE BESYLATE 10 MG/1
10 TABLET ORAL
Status: DISCONTINUED | OUTPATIENT
Start: 2021-06-06 | End: 2021-06-08 | Stop reason: HOSPADM

## 2021-06-06 RX ORDER — POTASSIUM CHLORIDE 20 MEQ/1
40 TABLET, EXTENDED RELEASE ORAL ONCE
Status: COMPLETED | OUTPATIENT
Start: 2021-06-06 | End: 2021-06-06

## 2021-06-06 RX ORDER — OMEPRAZOLE 20 MG/1
20 CAPSULE, DELAYED RELEASE ORAL 2 TIMES DAILY
Status: DISCONTINUED | OUTPATIENT
Start: 2021-06-06 | End: 2021-06-08 | Stop reason: HOSPADM

## 2021-06-06 RX ADMIN — SODIUM CHLORIDE, POTASSIUM CHLORIDE, SODIUM LACTATE AND CALCIUM CHLORIDE: 600; 310; 30; 20 INJECTION, SOLUTION INTRAVENOUS at 04:17

## 2021-06-06 RX ADMIN — HEPARIN SODIUM 5000 UNITS: 5000 INJECTION, SOLUTION INTRAVENOUS; SUBCUTANEOUS at 14:34

## 2021-06-06 RX ADMIN — HYDROMORPHONE HYDROCHLORIDE 1 MG: 1 INJECTION, SOLUTION INTRAMUSCULAR; INTRAVENOUS; SUBCUTANEOUS at 14:34

## 2021-06-06 RX ADMIN — MAGNESIUM SULFATE HEPTAHYDRATE 2 G: 40 INJECTION, SOLUTION INTRAVENOUS at 10:20

## 2021-06-06 RX ADMIN — HYDROMORPHONE HYDROCHLORIDE 1 MG: 1 INJECTION, SOLUTION INTRAMUSCULAR; INTRAVENOUS; SUBCUTANEOUS at 04:17

## 2021-06-06 RX ADMIN — LIDOCAINE HYDROCHLORIDE 30 ML: 20 SOLUTION OROPHARYNGEAL at 17:27

## 2021-06-06 RX ADMIN — HEPARIN SODIUM 5000 UNITS: 5000 INJECTION, SOLUTION INTRAVENOUS; SUBCUTANEOUS at 05:14

## 2021-06-06 RX ADMIN — OMEPRAZOLE 20 MG: 20 CAPSULE, DELAYED RELEASE ORAL at 17:22

## 2021-06-06 RX ADMIN — PIPERACILLIN AND TAZOBACTAM 4.5 G: 4; .5 INJECTION, POWDER, LYOPHILIZED, FOR SOLUTION INTRAVENOUS; PARENTERAL at 05:29

## 2021-06-06 RX ADMIN — HEPARIN SODIUM 5000 UNITS: 5000 INJECTION, SOLUTION INTRAVENOUS; SUBCUTANEOUS at 21:09

## 2021-06-06 RX ADMIN — AMLODIPINE BESYLATE 10 MG: 10 TABLET ORAL at 17:21

## 2021-06-06 RX ADMIN — OXYCODONE HYDROCHLORIDE 10 MG: 10 TABLET ORAL at 21:09

## 2021-06-06 RX ADMIN — OXYCODONE HYDROCHLORIDE 10 MG: 10 TABLET ORAL at 17:33

## 2021-06-06 RX ADMIN — HYDROMORPHONE HYDROCHLORIDE 1 MG: 1 INJECTION, SOLUTION INTRAMUSCULAR; INTRAVENOUS; SUBCUTANEOUS at 00:41

## 2021-06-06 RX ADMIN — METOPROLOL TARTRATE 12.5 MG: 25 TABLET, FILM COATED ORAL at 17:21

## 2021-06-06 RX ADMIN — ACETAMINOPHEN 650 MG: 325 TABLET, FILM COATED ORAL at 20:32

## 2021-06-06 RX ADMIN — HYDROMORPHONE HYDROCHLORIDE 1 MG: 1 INJECTION, SOLUTION INTRAMUSCULAR; INTRAVENOUS; SUBCUTANEOUS at 09:33

## 2021-06-06 RX ADMIN — PANTOPRAZOLE SODIUM 40 MG: 40 INJECTION, POWDER, LYOPHILIZED, FOR SOLUTION INTRAVENOUS at 05:14

## 2021-06-06 RX ADMIN — SODIUM CHLORIDE, POTASSIUM CHLORIDE, SODIUM LACTATE AND CALCIUM CHLORIDE: 600; 310; 30; 20 INJECTION, SOLUTION INTRAVENOUS at 15:47

## 2021-06-06 RX ADMIN — INSULIN HUMAN 3 UNITS: 100 INJECTION, SOLUTION PARENTERAL at 21:13

## 2021-06-06 RX ADMIN — POTASSIUM CHLORIDE 40 MEQ: 1500 TABLET, EXTENDED RELEASE ORAL at 10:18

## 2021-06-06 RX ADMIN — PIPERACILLIN AND TAZOBACTAM 4.5 G: 4; .5 INJECTION, POWDER, LYOPHILIZED, FOR SOLUTION INTRAVENOUS; PARENTERAL at 17:27

## 2021-06-06 RX ADMIN — PROCHLORPERAZINE EDISYLATE 5 MG: 5 INJECTION INTRAMUSCULAR; INTRAVENOUS at 05:14

## 2021-06-06 ASSESSMENT — ENCOUNTER SYMPTOMS
NAUSEA: 0
COUGH: 0
BACK PAIN: 0
VOMITING: 0
ABDOMINAL PAIN: 0
NERVOUS/ANXIOUS: 0
DIARRHEA: 0
DIZZINESS: 0
HEADACHES: 0
SENSORY CHANGE: 0
STRIDOR: 0
PALPITATIONS: 0
SHORTNESS OF BREATH: 0
FEVER: 0
SPEECH CHANGE: 0
CHILLS: 0

## 2021-06-06 ASSESSMENT — PAIN DESCRIPTION - PAIN TYPE
TYPE: ACUTE PAIN

## 2021-06-06 ASSESSMENT — FIBROSIS 4 INDEX: FIB4 SCORE: 1.15

## 2021-06-06 NOTE — RESPIRATORY CARE
COPD EDUCATION by COPD CLINICAL EDUCATOR  6/6/2021 at 7:07 AM by Sujata Ventuar, RRT     Patient reviewed by COPD education team. Patient does not have a history or diagnosis of COPD and is a non-smoker (quit>3yrs).  Therefore does not qualify for the COPD program. He has documented Asthma for which he has an Albuterol MDI as needed

## 2021-06-06 NOTE — PROGRESS NOTES
"Critical Care Progress Note    Date of admission  6/5/2021    Chief Complaint  45 y.o. male admitted 6/5/2021 with severe dehydration, hypotension, SHARIF, epigastric pain    Hospital Course  45 y.o. male, direct transfer from [facility] for septic shock and SHARIF. Pt reported that he had 1 day hx of abd pain, N/V. Pt also has watery diarrhea for 1 week. He was recently incarcerated yesterday evening at 6pm and around midnight he developed epigastric pain followed with vomiting x1, nonbloody. He was incarcerated for 3 days, no high-risk behavior, no sick contact. He was incarcerated multiple times for DUI (+marijuana per patient).   Pt denies any alcohol use. Hx of heavy alcohol use in the past and has quit for 11 months. Denies any IVDA.   At ED OSH, pt was given 2.5L fluid boluses, followed NE gtt at 5mcg/min. Started on vancomycin and piptazo. CT A/P was unremarkable. Not intubated.   Upon ICU arrival, pt is alert, oriented, on NE gtt at 2mcg/min. HR in 120s. On room air. Still having epigastric pain. Non radiating. Had cholecystectomy 1 year ago. Denies back pain, dysuria. Pt reported he hasn't been able to eat in the past week. No appetite.\"    6/6 -clinically stable, marked improvement in urine output, creatinine down to 6, replacing potassium and magnesium today, changed to p.o. PPI    Interval Problem Update  Reviewed last 24 hour events:   T-max 98.2  Sinus tachycardia, 100-120  Normotensive  I/O = 5.5/2.6  WBC 7.9 hemoglobin 11.6  Creatinine steadily improving 7.7->6.68->6.01  Potassium 3.4, magnesium 1.8  Glucose 77  Protonix 40 twice daily, change to PO BID  Zosyn day #2, stop if all cx's    Ongoing epigastric pain  SR/ST  Room air  Advance diet today  Mild nausea  OK to dc TLC  Sc heparin  Replace K and Mg      Review of Systems  Review of Systems   Unable to perform ROS: Acuity of condition        Vital Signs for last 24 hours   Temp:  [36 °C (96.8 °F)-36.8 °C (98.2 °F)] 36.8 °C (98.2 °F)  Pulse: "  [103-125] 118  Resp:  [8-25] 18  BP: ()/(58-96) 142/89  SpO2:  [91 %-96 %] 93 %    Hemodynamic parameters for last 24 hours       Respiratory Information for the last 24 hours       Physical Exam   Physical Exam  Vitals and nursing note reviewed.   Constitutional:       General: He is not in acute distress.     Appearance: He is not ill-appearing, toxic-appearing or diaphoretic.   HENT:      Head: Normocephalic and atraumatic.      Mouth/Throat:      Mouth: Mucous membranes are dry.      Pharynx: Oropharynx is clear.   Cardiovascular:      Rate and Rhythm: Normal rate and regular rhythm.      Pulses: Normal pulses.      Heart sounds: Normal heart sounds. No murmur heard.     Pulmonary:      Effort: Pulmonary effort is normal. No respiratory distress.      Breath sounds: Normal breath sounds. No wheezing, rhonchi or rales.   Abdominal:      General: There is no distension.      Palpations: Abdomen is soft. There is no mass.      Tenderness: There is abdominal tenderness.      Hernia: No hernia is present.      Comments: Epigastric pain    Musculoskeletal:         General: No swelling or tenderness.      Right lower leg: No edema.      Left lower leg: No edema.   Skin:     General: Skin is warm and dry.      Capillary Refill: Capillary refill takes less than 2 seconds.      Coloration: Skin is not jaundiced.   Neurological:      General: No focal deficit present.      Mental Status: He is alert and oriented to person, place, and time.      Cranial Nerves: No cranial nerve deficit.   Psychiatric:         Mood and Affect: Mood normal.         Medications  Current Facility-Administered Medications   Medication Dose Route Frequency Provider Last Rate Last Admin   • Respiratory Therapy Consult   Nebulization Continuous RT Donato Poe D.O.       • norepinephrine (Levophed) 8 mg in 250 mL NS infusion (premix)  0-30 mcg/min Intravenous Continuous Donato Poe D.O.   Stopped at 06/05/21 0700   • piperacillin-tazobactam  (ZOSYN) 4.5 g in  mL IVPB  4.5 g Intravenous Q12HRS Donato Poe D.O. 25 mL/hr at 06/06/21 0529 4.5 g at 06/06/21 0529   • heparin injection 5,000 Units  5,000 Units Subcutaneous Q8HRS Ger Hare M.D.   5,000 Units at 06/06/21 0514   • acetaminophen (Tylenol) tablet 650 mg  650 mg Oral Q6HRS PRN Ger Hare M.D.   650 mg at 06/05/21 0941   • ondansetron (ZOFRAN) syringe/vial injection 4 mg  4 mg Intravenous Q4HRS PRN Ger Hare M.D.   4 mg at 06/05/21 1730   • ondansetron (ZOFRAN ODT) dispertab 4 mg  4 mg Oral Q4HRS PRN Ger Hare M.D.       • promethazine (PHENERGAN) tablet 12.5-25 mg  12.5-25 mg Oral Q4HRS PRN Ger Hare M.D.       • promethazine (PHENERGAN) suppository 12.5-25 mg  12.5-25 mg Rectal Q4HRS PRN Ger Hare M.D.       • prochlorperazine (COMPAZINE) injection 5-10 mg  5-10 mg Intravenous Q4HRS PRN Ger Hare M.D.   5 mg at 06/06/21 0514   • pantoprazole (PROTONIX) injection 40 mg  40 mg Intravenous BID Donato Poe D.O.   40 mg at 06/06/21 0514   • HYDROmorphone (Dilaudid) injection 0.5-1 mg  0.5-1 mg Intravenous Q HOUR PRN Daniel Wells M.D.   1 mg at 06/06/21 0417   • lactated ringers infusion   Intravenous Continuous Daniel Wells M.D. 100 mL/hr at 06/06/21 0417 New Bag at 06/06/21 0417   • fluticasone (FLONASE) nasal spray 100 mcg  2 Spray Nasal QDAY PRN Nahum Senior M.D.           Fluids    Intake/Output Summary (Last 24 hours) at 6/6/2021 0744  Last data filed at 6/6/2021 0600  Gross per 24 hour   Intake 5514.39 ml   Output 2660 ml   Net 2854.39 ml       Laboratory      Recent Labs     06/05/21  0145   TROPONINI <0.01     Recent Labs     06/05/21  0145 06/05/21  0602 06/05/21  1100 06/06/21  0526   SODIUM 137 134*  --  138   POTASSIUM 3.5 3.7  --  3.4*   CHLORIDE 101 103  --  109   CO2 14* 15*  --  15*   BUN 52* 44*  --  44*   CREATININE 7.7* 6.68*  --  6.01*   MAGNESIUM  --   --  1.5 1.8   PHOSPHORUS  --   --   --  5.6*   CALCIUM 9.5  7.7*  --  8.1*     Recent Labs     06/05/21  0145 06/05/21  0602 06/05/21  1100 06/06/21  0526   ALTSGPT 47  --   --  29   ASTSGOT 46  --   --  28   ALKPHOSPHAT 114  --   --  88   TBILIRUBIN 0.5  --   --  0.4   AMYLASE  --  57  --   --    LIPASE 176 41 35  --    GLUCOSE 112* 139*  --  77     Recent Labs     06/05/21  0145 06/06/21  0526   WBC 14.3* 7.9   NEUTSPOLYS  --  65.60   LYMPHOCYTES  --  20.20*   MONOCYTES  --  8.80   EOSINOPHILS  --  4.50   BASOPHILS  --  0.50   ASTSGOT 46 28   ALTSGPT 47 29   ALKPHOSPHAT 114 88   TBILIRUBIN 0.5 0.4     Recent Labs     06/05/21  0145 06/05/21  0602 06/06/21  0526   RBC 4.60*  --  3.93*   HEMOGLOBIN 13.5*  --  11.6*   HEMATOCRIT 40.0*  --  35.9*   PLATELETCT 269  --  163*   PROTHROMBTM  --  14.3  --    INR  --  1.07  --        Imaging  X-Ray:  I have personally reviewed the images and compared with prior images.    Assessment/Plan  * Epigastric pain  Assessment & Plan  Unclear etiology.   CT A/P done at OSH was personally reviewed with our radiologist. No signs of bowel obstruction, aortic dissection, wall thickening of stomach, pancreatitis. No intraabdominal abscess noted.   DDx include PUD, esophagitis. Pt had similar pain when he had cholecysitis in the past and while he was drinking in the past  Gall bladder is absent, pt had cholecystectomy 1 year ago.   Lipase is normal. Pt denies active alcohol drink. Been sober for 11 months per patient.   LFT is normal  Denies IVDA  ECG normal sinus tach    Plan:  Keep NPO   Continue piptazo for empiric  Can discontinue vanco, MRSA screen is negative  Follow up blood cultures  Serial lactates  Check procalcitonin  Formal TTE. My bedside echo with grossly normal LVEF and RV size/function.   GI consult for possible EGD  Start protonix BID    SHARIF (acute kidney injury) (HCC)- (present on admission)  Assessment & Plan  Creatinine 7.7. Pt appears dry on exam. Likely prerenal vs ATN from hypotension  Pt has poor appetite in the past week.  N/V x1 day and diarrhea x 1 week  Had 2.5L fluid boluses at OSh  Will give another 2L fluid boluses  Strict I/Os, monitor UOP goal >40cc/hr.   Check UA, culture if needed  No hydronephrosis bilaterally on my bedside US   If pt not making urine despite fluid boluses, low threshold for nephrology consult.     Septic shock (HCC)  Assessment & Plan  This is Septic shock Present on admission  SIRS criteria identified on my evaluation include: Tachycardia, with heart rate greater than 90 BPM and Leukocytosis, with WBC greater than 12,000  Source is suspect GI  Presentation includes: Severe sepsis present and persistent hypotension after 30 ml/kg completed.   Despite appropriate fluid resuscitation with crystalloid given per sepsis guidelines, the patient remains hypotensive with systolic blood pressure less than 90 or MAP less than 65  Hemodynamic support with additional fluids and IV vasopressors as needed to maintain a SBP of 90 or MAP of 65  IV antibiotics as appropriate for source of sepsis  Reassessment: I have reassessed the patient's hemodynamic status    Suspect GI source due to epigastric pain.   Empiric antibiotics with piptazo  MRSA screen negative, discontinue vanco  Follow up blood cultures  Serial lactates  Keep MAP >65  Monitor UOP, strict I/Os. Place hong. Creatinine 7.7     Update:  Some ongoing epigastric discomfort, continue PPI, no evidence of GI bleeding  May warrant upper endoscopy at some point when renal function improved  Advance diet, increase p.o. fluid intake, still appears mildly intravascular volume depleted  Urine output significantly improving  Short course Zosyn, discontinue tomorrow if all cultures remain negative, lower suspicion for sepsis  Okay to transfer out of ICU today    VTE:  Heparin  Ulcer: PPI  Lines: None    I have performed a physical exam and reviewed and updated ROS and Plan today (6/6/2021). In review of yesterday's note (6/5/2021), there are no changes except as  documented above.     Discussed patient condition and risk of morbidity and/or mortality with RN, RT, Pharmacy and QA team

## 2021-06-06 NOTE — CARE PLAN
Problem: Knowledge Deficit - Standard  Goal: Patient and family/care givers will demonstrate understanding of plan of care, disease process/condition, diagnostic tests and medications  Outcome: Progressing     Problem: Pain - Standard  Goal: Alleviation of pain or a reduction in pain to the patient’s comfort goal  Outcome: Progressing   The patient is Stable - Low risk of patient condition declining or worsening    Shift Goals  Clinical Goals: increase urine output with saline boluses  Patient Goals: increase urine output, pain control  Family Goals: no family present    Progress made toward(s) clinical / shift goals:  Advancing diet today    Patient is not progressing towards the following goals:

## 2021-06-06 NOTE — CARE PLAN
The patient is Stable - Low risk of patient condition declining or worsening    Shift Goals  Clinical Goals: increase urine output with saline boluses  Patient Goals: increase urine output, pain control  Family Goals: no family present    Progress made toward(s) clinical / shift goals: Urine output has increased this shift after fluid boluses.  Pt pain under control with dilaudid pushes IV.    Patient is not progressing towards the following goals:Awaiting GI consult for patient to determine source of discomfort and to advance diet.

## 2021-06-07 LAB
ALBUMIN SERPL BCP-MCNC: 3.4 G/DL (ref 3.2–4.9)
ALBUMIN/GLOB SERPL: 1.2 G/DL
ALP SERPL-CCNC: 89 U/L (ref 30–99)
ALT SERPL-CCNC: 23 U/L (ref 2–50)
ANION GAP SERPL CALC-SCNC: 10 MMOL/L (ref 7–16)
AST SERPL-CCNC: 24 U/L (ref 12–45)
BASOPHILS # BLD AUTO: 0.8 % (ref 0–1.8)
BASOPHILS # BLD: 0.05 K/UL (ref 0–0.12)
BILIRUB SERPL-MCNC: 0.4 MG/DL (ref 0.1–1.5)
BUN SERPL-MCNC: 27 MG/DL (ref 8–22)
CALCIUM SERPL-MCNC: 8.2 MG/DL (ref 8.5–10.5)
CHLORIDE SERPL-SCNC: 103 MMOL/L (ref 96–112)
CO2 SERPL-SCNC: 19 MMOL/L (ref 20–33)
CREAT SERPL-MCNC: 2.46 MG/DL (ref 0.5–1.4)
EOSINOPHIL # BLD AUTO: 0.47 K/UL (ref 0–0.51)
EOSINOPHIL NFR BLD: 7.7 % (ref 0–6.9)
ERYTHROCYTE [DISTWIDTH] IN BLOOD BY AUTOMATED COUNT: 42.1 FL (ref 35.9–50)
GLOBULIN SER CALC-MCNC: 2.9 G/DL (ref 1.9–3.5)
GLUCOSE BLD-MCNC: 135 MG/DL (ref 65–99)
GLUCOSE BLD-MCNC: 149 MG/DL (ref 65–99)
GLUCOSE BLD-MCNC: 151 MG/DL (ref 65–99)
GLUCOSE SERPL-MCNC: 121 MG/DL (ref 65–99)
HCT VFR BLD AUTO: 33.1 % (ref 42–52)
HGB BLD-MCNC: 11.3 G/DL (ref 14–18)
IMM GRANULOCYTES # BLD AUTO: 0.03 K/UL (ref 0–0.11)
IMM GRANULOCYTES NFR BLD AUTO: 0.5 % (ref 0–0.9)
LYMPHOCYTES # BLD AUTO: 1.78 K/UL (ref 1–4.8)
LYMPHOCYTES NFR BLD: 29.1 % (ref 22–41)
MAGNESIUM SERPL-MCNC: 1.7 MG/DL (ref 1.5–2.5)
MCH RBC QN AUTO: 30.2 PG (ref 27–33)
MCHC RBC AUTO-ENTMCNC: 34.1 G/DL (ref 33.7–35.3)
MCV RBC AUTO: 88.5 FL (ref 81.4–97.8)
MONOCYTES # BLD AUTO: 0.66 K/UL (ref 0–0.85)
MONOCYTES NFR BLD AUTO: 10.8 % (ref 0–13.4)
NEUTROPHILS # BLD AUTO: 3.12 K/UL (ref 1.82–7.42)
NEUTROPHILS NFR BLD: 51.1 % (ref 44–72)
NRBC # BLD AUTO: 0 K/UL
NRBC BLD-RTO: 0 /100 WBC
PHOSPHATE SERPL-MCNC: 2.7 MG/DL (ref 2.5–4.5)
PLATELET # BLD AUTO: 169 K/UL (ref 164–446)
PMV BLD AUTO: 10.4 FL (ref 9–12.9)
POTASSIUM SERPL-SCNC: 3.5 MMOL/L (ref 3.6–5.5)
PROT SERPL-MCNC: 6.3 G/DL (ref 6–8.2)
RBC # BLD AUTO: 3.74 M/UL (ref 4.7–6.1)
SODIUM SERPL-SCNC: 132 MMOL/L (ref 135–145)
WBC # BLD AUTO: 6.1 K/UL (ref 4.8–10.8)

## 2021-06-07 PROCEDURE — 700111 HCHG RX REV CODE 636 W/ 250 OVERRIDE (IP): Performed by: STUDENT IN AN ORGANIZED HEALTH CARE EDUCATION/TRAINING PROGRAM

## 2021-06-07 PROCEDURE — A9270 NON-COVERED ITEM OR SERVICE: HCPCS | Performed by: INTERNAL MEDICINE

## 2021-06-07 PROCEDURE — A9270 NON-COVERED ITEM OR SERVICE: HCPCS | Performed by: STUDENT IN AN ORGANIZED HEALTH CARE EDUCATION/TRAINING PROGRAM

## 2021-06-07 PROCEDURE — 700111 HCHG RX REV CODE 636 W/ 250 OVERRIDE (IP): Performed by: INTERNAL MEDICINE

## 2021-06-07 PROCEDURE — 700105 HCHG RX REV CODE 258: Performed by: HOSPITALIST

## 2021-06-07 PROCEDURE — 85025 COMPLETE CBC W/AUTO DIFF WBC: CPT

## 2021-06-07 PROCEDURE — 82962 GLUCOSE BLOOD TEST: CPT | Mod: 91

## 2021-06-07 PROCEDURE — 84100 ASSAY OF PHOSPHORUS: CPT

## 2021-06-07 PROCEDURE — 700102 HCHG RX REV CODE 250 W/ 637 OVERRIDE(OP): Performed by: INTERNAL MEDICINE

## 2021-06-07 PROCEDURE — A9270 NON-COVERED ITEM OR SERVICE: HCPCS | Performed by: HOSPITALIST

## 2021-06-07 PROCEDURE — 700111 HCHG RX REV CODE 636 W/ 250 OVERRIDE (IP): Performed by: HOSPITALIST

## 2021-06-07 PROCEDURE — 97165 OT EVAL LOW COMPLEX 30 MIN: CPT

## 2021-06-07 PROCEDURE — 97161 PT EVAL LOW COMPLEX 20 MIN: CPT

## 2021-06-07 PROCEDURE — 770001 HCHG ROOM/CARE - MED/SURG/GYN PRIV*

## 2021-06-07 PROCEDURE — 83735 ASSAY OF MAGNESIUM: CPT

## 2021-06-07 PROCEDURE — 700105 HCHG RX REV CODE 258: Performed by: INTERNAL MEDICINE

## 2021-06-07 PROCEDURE — 700102 HCHG RX REV CODE 250 W/ 637 OVERRIDE(OP): Performed by: STUDENT IN AN ORGANIZED HEALTH CARE EDUCATION/TRAINING PROGRAM

## 2021-06-07 PROCEDURE — 700102 HCHG RX REV CODE 250 W/ 637 OVERRIDE(OP): Performed by: HOSPITALIST

## 2021-06-07 PROCEDURE — G0480 DRUG TEST DEF 1-7 CLASSES: HCPCS

## 2021-06-07 PROCEDURE — 99233 SBSQ HOSP IP/OBS HIGH 50: CPT | Performed by: HOSPITALIST

## 2021-06-07 PROCEDURE — 80053 COMPREHEN METABOLIC PANEL: CPT

## 2021-06-07 RX ORDER — POTASSIUM CHLORIDE 20 MEQ/1
40 TABLET, EXTENDED RELEASE ORAL DAILY
Status: DISCONTINUED | OUTPATIENT
Start: 2021-06-07 | End: 2021-06-08 | Stop reason: HOSPADM

## 2021-06-07 RX ORDER — AMITRIPTYLINE HYDROCHLORIDE 25 MG/1
25 TABLET, FILM COATED ORAL EVERY EVENING
Status: DISCONTINUED | OUTPATIENT
Start: 2021-06-07 | End: 2021-06-08 | Stop reason: HOSPADM

## 2021-06-07 RX ORDER — BUPROPION HYDROCHLORIDE 75 MG/1
75 TABLET ORAL 2 TIMES DAILY
Status: DISCONTINUED | OUTPATIENT
Start: 2021-06-07 | End: 2021-06-08 | Stop reason: HOSPADM

## 2021-06-07 RX ORDER — MAGNESIUM SULFATE HEPTAHYDRATE 40 MG/ML
2 INJECTION, SOLUTION INTRAVENOUS ONCE
Status: COMPLETED | OUTPATIENT
Start: 2021-06-07 | End: 2021-06-07

## 2021-06-07 RX ADMIN — ACETAMINOPHEN 650 MG: 325 TABLET, FILM COATED ORAL at 04:57

## 2021-06-07 RX ADMIN — OXYCODONE HYDROCHLORIDE 10 MG: 10 TABLET ORAL at 01:02

## 2021-06-07 RX ADMIN — OXYCODONE HYDROCHLORIDE 10 MG: 10 TABLET ORAL at 12:52

## 2021-06-07 RX ADMIN — ONDANSETRON 4 MG: 2 INJECTION INTRAMUSCULAR; INTRAVENOUS at 08:43

## 2021-06-07 RX ADMIN — OXYCODONE HYDROCHLORIDE 10 MG: 10 TABLET ORAL at 04:56

## 2021-06-07 RX ADMIN — OXYCODONE HYDROCHLORIDE 10 MG: 10 TABLET ORAL at 08:55

## 2021-06-07 RX ADMIN — SODIUM CHLORIDE, POTASSIUM CHLORIDE, SODIUM LACTATE AND CALCIUM CHLORIDE: 600; 310; 30; 20 INJECTION, SOLUTION INTRAVENOUS at 01:31

## 2021-06-07 RX ADMIN — PIPERACILLIN AND TAZOBACTAM 4.5 G: 4; .5 INJECTION, POWDER, LYOPHILIZED, FOR SOLUTION INTRAVENOUS; PARENTERAL at 05:44

## 2021-06-07 RX ADMIN — HEPARIN SODIUM 5000 UNITS: 5000 INJECTION, SOLUTION INTRAVENOUS; SUBCUTANEOUS at 13:05

## 2021-06-07 RX ADMIN — BUPROPION HYDROCHLORIDE 75 MG: 75 TABLET, FILM COATED ORAL at 13:57

## 2021-06-07 RX ADMIN — POTASSIUM CHLORIDE 40 MEQ: 1500 TABLET, EXTENDED RELEASE ORAL at 11:30

## 2021-06-07 RX ADMIN — SODIUM CHLORIDE, POTASSIUM CHLORIDE, SODIUM LACTATE AND CALCIUM CHLORIDE: 600; 310; 30; 20 INJECTION, SOLUTION INTRAVENOUS at 19:54

## 2021-06-07 RX ADMIN — OMEPRAZOLE 20 MG: 20 CAPSULE, DELAYED RELEASE ORAL at 04:57

## 2021-06-07 RX ADMIN — LIDOCAINE HYDROCHLORIDE 30 ML: 20 SOLUTION OROPHARYNGEAL at 21:09

## 2021-06-07 RX ADMIN — AMITRIPTYLINE HYDROCHLORIDE 25 MG: 25 TABLET, FILM COATED ORAL at 17:05

## 2021-06-07 RX ADMIN — METOPROLOL TARTRATE 25 MG: 25 TABLET, FILM COATED ORAL at 17:05

## 2021-06-07 RX ADMIN — METOPROLOL TARTRATE 12.5 MG: 25 TABLET, FILM COATED ORAL at 04:58

## 2021-06-07 RX ADMIN — OXYCODONE HYDROCHLORIDE 10 MG: 10 TABLET ORAL at 21:09

## 2021-06-07 RX ADMIN — HEPARIN SODIUM 5000 UNITS: 5000 INJECTION, SOLUTION INTRAVENOUS; SUBCUTANEOUS at 04:57

## 2021-06-07 RX ADMIN — OXYCODONE HYDROCHLORIDE 10 MG: 10 TABLET ORAL at 17:09

## 2021-06-07 RX ADMIN — MAGNESIUM SULFATE HEPTAHYDRATE 2 G: 40 INJECTION, SOLUTION INTRAVENOUS at 11:29

## 2021-06-07 RX ADMIN — AMLODIPINE BESYLATE 10 MG: 10 TABLET ORAL at 05:43

## 2021-06-07 RX ADMIN — HEPARIN SODIUM 5000 UNITS: 5000 INJECTION, SOLUTION INTRAVENOUS; SUBCUTANEOUS at 21:09

## 2021-06-07 RX ADMIN — OMEPRAZOLE 20 MG: 20 CAPSULE, DELAYED RELEASE ORAL at 17:05

## 2021-06-07 ASSESSMENT — COGNITIVE AND FUNCTIONAL STATUS - GENERAL
PERSONAL GROOMING: A LITTLE
HELP NEEDED FOR BATHING: A LITTLE
MOBILITY SCORE: 22
SUGGESTED CMS G CODE MODIFIER DAILY ACTIVITY: CJ
WALKING IN HOSPITAL ROOM: A LITTLE
CLIMB 3 TO 5 STEPS WITH RAILING: A LITTLE
SUGGESTED CMS G CODE MODIFIER MOBILITY: CJ
DAILY ACTIVITIY SCORE: 22

## 2021-06-07 ASSESSMENT — GAIT ASSESSMENTS
DISTANCE (FEET): 400
GAIT LEVEL OF ASSIST: SUPERVISED
DEVIATION: NO DEVIATION

## 2021-06-07 ASSESSMENT — ENCOUNTER SYMPTOMS
HEADACHES: 0
PALPITATIONS: 0
SHORTNESS OF BREATH: 0
ABDOMINAL PAIN: 0
SORE THROAT: 0
COUGH: 0
FEVER: 0
NERVOUS/ANXIOUS: 1
DIZZINESS: 0
SPEECH CHANGE: 0
DIARRHEA: 0
NAUSEA: 0
BACK PAIN: 0

## 2021-06-07 ASSESSMENT — PAIN DESCRIPTION - PAIN TYPE
TYPE: ACUTE PAIN

## 2021-06-07 ASSESSMENT — LIFESTYLE VARIABLES: SUBSTANCE_ABUSE: 0

## 2021-06-07 ASSESSMENT — ACTIVITIES OF DAILY LIVING (ADL): TOILETING: INDEPENDENT

## 2021-06-07 NOTE — DIETARY
Nutrition services: Day 2 of admit.  Enrique Rivera is a 45 y.o. male with admitting DX of septic shock.   Pt noted with poor PO intake and wt loss on nutrition admit screen.  Pt appears nourished though ill.  RD was able to visit pt at bedside to discuss appetite/intake and wt hx.  Pt reports poor appetite this past week w/ n/v and abdominal pain - still some ongoing s/s - emesis this AM.  No c/o constipation though some watery/loose, small bouts of diarrhea.  Pt does not associated certain foods w/ GI distress - discussed monitoring/avoiding higher fat, greasy, fried foods.  Pt verbalized understanding.  Pt reports UBW is ~180 lbs, last weighing ~1 week PTA and feels he has lost wt d/t GI distress and poor appetite.  Initial admit wt was 184 lbs.  Pt with no further needs or questions - will continue to monitor tolerance of diet w/ adequate intake.     Assessment:  Height: 182.9 cm (6')  Weight: 88.8 kg (195 lb 12.3 oz) - via bed scale.   Body mass index is 26.55 kg/m²., BMI classification: Overweight.   Diet/Intake: Consistent CHO - % thus far though noted w/ emesis.     Evaluation:   1. Pt noted with epigastric pain, SHARIF, and T2DM.  Hx of heavy ETOH use, HTN, and PVD.  Denies ETOH use the past 11 months.  Transferred from [facility] for septic shock and renal failure.    2. No staged wounds or edema noted per flowsheet/progress notes.  3. Current clinical picture and MD progress notes reviewed.  4. Current admit wt is above reported UBW - will continue to monitor wt trends.  5. Labs: Sodium: 132, Potassium: 3.5, Glucose: 121, BUN: 27, Creat.: 2.46.   6. Meds: SSI, Prilosec, Kdur, Lactated ringers @ 100 mL/hr.  7. LBM: 6/7.     Malnutrition Risk: Does not meet malnutrition criteria per ASPEN Guidelines @ this time.  Remains at ris 2/2 ETOH use.    Recommendations/Plan:   1. Encourage intake of meals.  Continue to document % consumed under ADLs.    2. Monitor weight.  3. Nutrition rep will continue  to see patient for ongoing meal and snack preferences.     RD follows.

## 2021-06-07 NOTE — CARE PLAN
Problem: Pain - Standard  Goal: Alleviation of pain or a reduction in pain to the patient’s comfort goal  Outcome: Progressing     Problem: Knowledge Deficit - Standard  Goal: Patient and family/care givers will demonstrate understanding of plan of care, disease process/condition, diagnostic tests and medications  Outcome: Progressing   The patient is Stable - Low risk of patient condition declining or worsening    Shift Goals  Clinical Goals: increase urine output with saline boluses  Patient Goals: pain control  Family Goals: no family present    Progress made toward(s) clinical / shift goals:    Patient is not progressing towards the following goals:

## 2021-06-07 NOTE — ASSESSMENT & PLAN NOTE
Monitor vitals.  Amlodipine 10mg daily  Holding outpatient lisinopril due to renal failure.  increase metoprolol to 25mg BID with parameters

## 2021-06-07 NOTE — DISCHARGE PLANNING
Care Transition Team Discharge Planning     Anticipated Discharge Disposition: TBD     Action: Per hospitalist report, Md indicates pt is closer to d/c but not medically clear yet.      Barriers to Discharge: not medically stable     Plan: Lsw will continue to follow, and assist w/ d/c planning.

## 2021-06-07 NOTE — CARE PLAN
The patient is Stable - Low risk of patient condition declining or worsening    Shift Goals  Clinical Goals: increase urine output with saline boluses  Patient Goals: pain control  Family Goals: no family present      Problem: Knowledge Deficit - Standard  Goal: Patient and family/care givers will demonstrate understanding of plan of care, disease process/condition, diagnostic tests and medications  Outcome: Progressing  Note: Pt able to provide teach back.     Problem: Pain - Standard  Goal: Alleviation of pain or a reduction in pain to the patient’s comfort goal  Outcome: Progressing  Note: Pt receptive to non pharmacologic pain interventions

## 2021-06-07 NOTE — PROGRESS NOTES
Hospital Medicine Daily Progress Note    Date of Service  6/6/2021    Chief Complaint  Transfer for shock and acute kidney failure    Hospital Course  This is a 44yo M transferred from [facility] for septic shock and renal fialure.  He has had watery diarrhea for 1 week and nausea with vomiting x 1 day.  He was incarcerated 3 days for a DUI, yet denies recent alcohol use.  Here he has received IV fluids.     Interval Problem Update  6/6: Awake and alert.  Denies EtoH use in past 11 months.  No tremors.  Remains with liver failure.  Epigastric pain with heartburn.  Stopped dilaudid and give GI cocktail and start oxycodone if need    Consultants/Specialty  Critical Care- signed off    Code Status  Full Code    Disposition  Transfer to medical floor (orders placed 6/6)    Review of Systems  Review of Systems   Constitutional: Negative for chills and fever.   Respiratory: Negative for cough, shortness of breath and stridor.    Cardiovascular: Negative for chest pain, palpitations and leg swelling.   Gastrointestinal: Negative for abdominal pain, diarrhea, nausea and vomiting.   Genitourinary: Negative for dysuria and hematuria.   Musculoskeletal: Negative for back pain and joint pain.   Skin: Negative for rash.   Neurological: Negative for dizziness, sensory change, speech change and headaches.   Psychiatric/Behavioral: The patient is not nervous/anxious.         Physical Exam  Temp:  [36.4 °C (97.5 °F)-36.7 °C (98 °F)] 36.4 °C (97.5 °F)  Pulse:  [] 95  Resp:  [8-23] 16  BP: (104-154)/() 154/100  SpO2:  [91 %-96 %] 96 %    Physical Exam  Vitals reviewed.   Constitutional:       Appearance: Normal appearance. He is not diaphoretic.   HENT:      Head: Normocephalic and atraumatic.      Nose: Nose normal.      Mouth/Throat:      Mouth: Mucous membranes are moist.      Pharynx: No oropharyngeal exudate.   Eyes:      General: No scleral icterus.        Right eye: No discharge.         Left eye: No  discharge.      Extraocular Movements: Extraocular movements intact.      Conjunctiva/sclera: Conjunctivae normal.   Cardiovascular:      Rate and Rhythm: Normal rate and regular rhythm.      Pulses:           Radial pulses are 2+ on the right side and 2+ on the left side.        Dorsalis pedis pulses are 2+ on the right side and 2+ on the left side.      Heart sounds: No murmur heard.     Pulmonary:      Effort: Pulmonary effort is normal. No respiratory distress.      Breath sounds: Normal breath sounds. No wheezing or rales.   Abdominal:      General: Bowel sounds are normal. There is no distension.      Palpations: Abdomen is soft.   Musculoskeletal:         General: No swelling or tenderness.      Cervical back: Normal range of motion and neck supple. No muscular tenderness.      Right lower leg: No edema.      Left lower leg: No edema.   Skin:     Coloration: Skin is not jaundiced or pale.   Neurological:      General: No focal deficit present.      Mental Status: He is alert and oriented to person, place, and time. Mental status is at baseline.      Cranial Nerves: No cranial nerve deficit.   Psychiatric:         Mood and Affect: Mood normal.         Behavior: Behavior normal.         Fluids    Intake/Output Summary (Last 24 hours) at 6/6/2021 1934  Last data filed at 6/6/2021 1800  Gross per 24 hour   Intake 3048.75 ml   Output 4135 ml   Net -1086.25 ml       Laboratory  Recent Labs     06/05/21  0145 06/06/21  0526   WBC 14.3* 7.9   RBC 4.60* 3.93*   HEMOGLOBIN 13.5* 11.6*   HEMATOCRIT 40.0* 35.9*   MCV 87.0 91.3   MCH 29.3 29.5   MCHC 33.8 32.3*   RDW 12.5 42.9   PLATELETCT 269 163*   MPV 10.4 10.4     Recent Labs     06/05/21  0145 06/05/21  0602 06/06/21  0526   SODIUM 137 134* 138   POTASSIUM 3.5 3.7 3.4*   CHLORIDE 101 103 109   CO2 14* 15* 15*   GLUCOSE 112* 139* 77   BUN 52* 44* 44*   CREATININE 7.7* 6.68* 6.01*   CALCIUM 9.5 7.7* 8.1*     Recent Labs     06/05/21  0602   INR 1.07         Recent Labs      06/06/21  0526   TRIGLYCERIDE 136   HDL 36*   LDL 42       Imaging  No orders to display        Assessment/Plan  * Epigastric pain  Assessment & Plan  Hx of ulcer  Omeprazole BID  GI cocktail for acute pain  Monitor hgb    SHARIF (acute kidney injury) ([facility])- (present on admission)  Assessment & Plan  Monitor renal function  IV fluids  Consult nephrology if not improving w/ fluids  Avoid nephrotoxins    Type 2 diabetes mellitus with diabetic neuropathy, without long-term current use of insulin ([facility])- (present on admission)  Assessment & Plan  Uncontrolled  A1c:11.1 in past month  Diabetic diet  Monitor accuchecks and cover with SSI       VTE prophylaxis: Heparin

## 2021-06-07 NOTE — THERAPY
Physical Therapy   Initial Evaluation     Patient Name: Enrique Rivera  Age:  45 y.o., Sex:  male  Medical Record #: [id number]  Today's Date: 6/7/2021     Precautions: Fall Risk    Assessment  Patient is 45 y.o. male admitted with epigastric pain found to have septic shock and renal failure. PMH includes incarceration for 3 days d/t DUI, ulcer, uncontrolled DMII. Pt lives with his mother and was independent prior to admission. Pt appears to be at his functional baseline ambulating with no AD and SPV. Patient will not be actively followed for physical therapy services at this time, however may be seen if requested by physician for 1 more visit within 30 days to address any discharge or equipment needs.    Plan    Recommend Physical Therapy for Evaluation only     DC Equipment Recommendations: None  Discharge Recommendations: Anticipate that the patient will have no further physical therapy needs after discharge from the hospital          06/07/21 0809   Prior Living Situation   Prior Services Home-Independent   Housing / Facility 1 Story House   Steps Into Home 2   Steps In Home 0   Bathroom Set up Bathtub / Shower Combination;Grab Bars   Equipment Owned Grab Bar(s) In Tub / Shower   Lives with - Patient's Self Care Capacity Parents   Comments lives with his mother who is independent   Prior Level of Functional Mobility   Bed Mobility Independent   Transfer Status Independent   Ambulation Independent   Distance Ambulation (Feet)   (community)   Assistive Devices Used None   Stairs Independent   Comments independent prior with mobility and ADLs   Cognition    Level of Consciousness Alert   Comments pleasant and cooperative    Gait Analysis   Gait Level Of Assist Supervised   Assistive Device None   Distance (Feet) 400   # of Times Distance was Traveled 1   Deviation No deviation   Weight Bearing Status no restrictions   Comments no LOB   Bed Mobility    Supine to Sit Supervised   Sit to Supine Supervised   Scooting  Supervised   Functional Mobility   Sit to Stand Supervised   Bed, Chair, Wheelchair Transfer Supervised   Toilet Transfers Supervised   Transfer Method Stand Step   [facility]   Education Provided Role of Physical Therapist   Role of Physical Therapist Patient Response Patient;Acceptance;Explanation;Verbal Demonstration   Anticipated Discharge Equipment and Recommendations   DC Equipment Recommendations None   Discharge Recommendations Anticipate that the patient will have no further physical therapy needs after discharge from the hospital

## 2021-06-07 NOTE — THERAPY
Occupational Therapy   Initial Evaluation     Patient Name: Enrique Rivera  Age:  45 y.o., Sex:  male  Medical Record #: [id number]  Today's Date: 6/7/2021     Precautions  Precautions: Fall Risk    Assessment  Patient is 45 y.o. male admitted for epigastric pain found to have septic shock and renal failure. PMHx of incarceration for 3 day d/t DUI (but denies ETOH abuse), ulcer, and uncontrolled DMII. Completed ADLs/txfs with SPV and functional ambulation w/o AD req edu for safety with tub txfs. Reports lives with mother who can assist PRN. Patient will not be actively followed for occupational therapy services at this time, however may be seen if requested by physician for 1 more visit within 30 days to address any discharge or equipment needs.     Plan    Recommend Occupational Therapy d/c needs only      DC Equipment Recommendations: Tub / Shower Seat  Discharge Recommendations: Anticipate that the patient will have no further occupational therapy needs after discharge from the hospital (as long as family can assist PRN)      Objective       06/07/21 0803   Prior Living Situation   Prior Services Home-Independent   Housing / Facility [facility]   Bathroom Set up Bathtub / Shower Combination;Grab Bars   Equipment Owned Grab Bar(s) In Tub / Shower   Lives with - Patient's Self Care Capacity Parents   Comments Reports lives w/ mother who can assist PRN   Prior Level of ADL Function   Self Feeding Independent   Grooming / Hygiene Independent   Bathing Independent   Dressing Independent   Toileting Independent   Prior Level of IADL Function   Medication Management Independent   Laundry Independent   Kitchen Mobility Independent   Finances Independent   Home Management Independent   Shopping Independent   Prior Level Of Mobility Independent Without Device in Community;Independent Without Device in Home   Driving / Transportation Driving Independent   Occupation (Pre-Hospital Vocational) Not Employed   Precautions    Precautions Fall Risk   Vitals   O2 Delivery Device None - Room Air   Pain 0 - 10 Group   Therapist Pain Assessment Post Activity Pain Same as Prior to Activity;During Activity;Nurse Notified  (no c/o pain)   Cognition    Cognition / Consciousness WDL   Level of Consciousness Alert   Comments pleasant and cooperative   Passive ROM Upper Body   Passive ROM Upper Body WDL   Active ROM Upper Body   Active ROM Upper Body  WDL   Strength Upper Body   Upper Body Strength  WDL   Comments functional for ADLs   Balance Assessment   Sitting Balance (Static) Good   Sitting Balance (Dynamic) Fair +   Standing Balance (Static) Fair   Standing Balance (Dynamic) Fair   Weight Shift Sitting Good   Weight Shift Standing Fair   Comments w/o AD   Bed Mobility    Supine to Sit Supervised   Sit to Supine Supervised   Scooting Supervised   Comments HOB elevated   ADL Assessment   Eating Supervision   Grooming   (declined need)   Lower Body Dressing Supervision   Toileting Supervision   How much help from another person does the patient currently need...   Putting on and taking off regular lower body clothing? 4   Bathing (including washing, rinsing, and drying)? 3   Toileting, which includes using a toilet, bedpan, or urinal? 4   Putting on and taking off regular upper body clothing? 4   Taking care of personal grooming such as brushing teeth? 3   Eating meals? 4   6 Clicks Daily Activity Score 22   Functional Mobility   Sit to Stand Supervised   Bed, Chair, Wheelchair Transfer Supervised   Toilet Transfers Supervised   Tub / Shower Transfers   (edu on safety with tub txf)   Transfer Method Stand Step   Mobility w/o AD in room and hallway   ICU Target Mobility Level   ICU Mobility - Targeted Level Level 4   Edema / Skin Assessment   Edema / Skin  Not Assessed   Activity Tolerance   Sitting in Chair 1 min on toilet   Sitting Edge of Bed 2 min   Standing 6 min   Comments no c/o fatigue

## 2021-06-07 NOTE — CARE PLAN
Problem: Nutritional:  Goal: Achieve adequate nutritional intake  Description: Patient will consume 50-75% or > of meals w/o emesis  Outcome: Not Progressing

## 2021-06-07 NOTE — ASSESSMENT & PLAN NOTE
Monitor renal function  IV fluids, wean.  Encouraged oral intake  6/7 BUN:27 , Cr:2.46  Consult nephrology if not improving w/ fluids  Avoid nephrotoxins

## 2021-06-07 NOTE — PROGRESS NOTES
[facility] Medicine Daily Progress Note    Date of Service  6/7/2021    Chief Complaint  Transfer for shock and acute kidney failure    Hospital Course  This is a 44yo M transferred from [facility] for septic shock and renal failure.  He has had watery diarrhea for 1 week and nausea with vomiting x 1 day.  He was incarcerated 3 days for a DUI, yet denies recent alcohol use.  Here he has received IV fluids.     Interval Problem Update  6/6: Awake and alert.  Denies EtoH use in past 11 months.  No tremors.  Remains with liver failure.  Epigastric pain with heartburn.  Stopped dilaudid and give GI cocktail and start oxycodone if need    6/7 Denies EtoH x 11 months.  Anxiety today he states.  Speech clear.  Oriented.     Consultants/Specialty  Critical Care- signed off    Code Status  Full Code    Disposition  Transfer to medical floor (orders placed 6/6)    Review of Systems  Review of Systems   Constitutional: Negative for fever and malaise/fatigue.   HENT: Negative for sore throat.    Respiratory: Negative for cough and shortness of breath.    Cardiovascular: Negative for chest pain, palpitations and leg swelling.   Gastrointestinal: Negative for abdominal pain, diarrhea and nausea.   Genitourinary: Positive for frequency. Negative for dysuria and hematuria.   Musculoskeletal: Negative for back pain and joint pain.   Neurological: Negative for dizziness, speech change and headaches.   Psychiatric/Behavioral: Negative for substance abuse. The patient is nervous/anxious.         Physical Exam  Temp:  [36.3 °C (97.3 °F)-36.5 °C (97.7 °F)] 36.3 °C (97.3 °F)  Pulse:  [] 96  Resp:  [16-18] 18  BP: ()/() 154/94  SpO2:  [93 %-99 %] 99 %    Physical Exam  Vitals reviewed.   Constitutional:       Appearance: Normal appearance. He is not diaphoretic.   HENT:      Head: Normocephalic and atraumatic.      Nose: Nose normal.      Mouth/Throat:      Mouth: Mucous membranes are moist.      Pharynx: No  oropharyngeal exudate.   Eyes:      General: No scleral icterus.        Right eye: No discharge.         Left eye: No discharge.      Extraocular Movements: Extraocular movements intact.      Conjunctiva/sclera: Conjunctivae normal.   Cardiovascular:      Rate and Rhythm: Normal rate and regular rhythm.      Pulses:           Radial pulses are 2+ on the right side and 2+ on the left side.        Dorsalis pedis pulses are 2+ on the right side and 2+ on the left side.      Heart sounds: No murmur heard.     Pulmonary:      Effort: Pulmonary effort is normal. No respiratory distress.      Breath sounds: Normal breath sounds. No wheezing or rales.   Abdominal:      General: Bowel sounds are normal. There is no distension.      Palpations: Abdomen is soft.   Musculoskeletal:         General: No swelling or tenderness.      Cervical back: Normal range of motion and neck supple. No muscular tenderness.      Right lower leg: No edema.      Left lower leg: No edema.   Skin:     Coloration: Skin is not jaundiced or pale.   Neurological:      General: No focal deficit present.      Mental Status: He is alert and oriented to person, place, and time. Mental status is at baseline.      Cranial Nerves: No cranial nerve deficit.   Psychiatric:         Mood and Affect: Mood normal.         Behavior: Behavior normal.         Fluids    Intake/Output Summary (Last 24 hours) at 6/7/2021 1539  Last data filed at 6/7/2021 1400  Gross per 24 hour   Intake 4640 ml   Output 4575 ml   Net 65 ml       Laboratory  Recent Labs     06/05/21  0145 06/06/21  0526 06/07/21  0358   WBC 14.3* 7.9 6.1   RBC 4.60* 3.93* 3.74*   HEMOGLOBIN 13.5* 11.6* 11.3*   HEMATOCRIT 40.0* 35.9* 33.1*   MCV 87.0 91.3 88.5   MCH 29.3 29.5 30.2   MCHC 33.8 32.3* 34.1   RDW 12.5 42.9 42.1   PLATELETCT 269 163* 169   MPV 10.4 10.4 10.4     Recent Labs     06/05/21  0602 06/06/21  0526 06/07/21  0358   SODIUM 134* 138 132*   POTASSIUM 3.7 3.4* 3.5*   CHLORIDE 103 109 103    CO2 15* 15* 19*   GLUCOSE 139* 77 121*   BUN 44* 44* 27*   CREATININE 6.68* 6.01* 2.46*   CALCIUM 7.7* 8.1* 8.2*     Recent Labs     21  0602   INR 1.07         Recent Labs     21  0526   TRIGLYCERIDE 136   HDL 36*   LDL 42       Imaging  No orders to display        Assessment/Plan  * Epigastric pain  Assessment & Plan  Hx of ulcer  Omeprazole BID  GI cocktail for acute pain  Monitor hgb    SHARIF (acute kidney injury) ([facility])- (present on admission)  Assessment & Plan  Monitor renal function  IV fluids, wean.  Encouraged oral intake   BUN:27 , Cr:2.46  Consult nephrology if not improving w/ fluids  Avoid nephrotoxins    Anxiety- (present on admission)  Assessment & Plan  Initiated on amitriptyline 25mg q pm and wellbutrin 75mg BID.  Monitor    Essential hypertension  Assessment & Plan  Monitor vitals.  Amlodipine 10mg daily  Holding outpatient lisinopril due to renal failure.  increase metoprolol to 25mg BID with parameters    Type 2 diabetes mellitus with diabetic neuropathy, without long-term current use of insulin ([facility])- (present on admission)  Assessment & Plan  Uncontrolled  A1c:11.1 in past month  Diabetic diet  Monitor accuchecks and cover with SSI    Hypomagnesemia  Assessment & Plan  Goal Mg >2  6/7 M.7  Give 2g IV MgSO4  monitor       VTE prophylaxis: Heparin

## 2021-06-08 ENCOUNTER — PATIENT OUTREACH (OUTPATIENT)
Dept: HEALTH INFORMATION MANAGEMENT | Facility: OTHER | Age: 46
End: 2021-06-08

## 2021-06-08 VITALS
DIASTOLIC BLOOD PRESSURE: 84 MMHG | SYSTOLIC BLOOD PRESSURE: 131 MMHG | HEART RATE: 92 BPM | WEIGHT: 195.11 LBS | TEMPERATURE: 97.5 F | OXYGEN SATURATION: 98 % | RESPIRATION RATE: 18 BRPM | BODY MASS INDEX: 26.43 KG/M2 | HEIGHT: 72 IN

## 2021-06-08 LAB
ALBUMIN SERPL BCP-MCNC: 3.5 G/DL (ref 3.2–4.9)
ALBUMIN/GLOB SERPL: 1.2 G/DL
ALP SERPL-CCNC: 98 U/L (ref 30–99)
ALT SERPL-CCNC: 29 U/L (ref 2–50)
ANION GAP SERPL CALC-SCNC: 10 MMOL/L (ref 7–16)
AST SERPL-CCNC: 33 U/L (ref 12–45)
BASOPHILS # BLD AUTO: 0.6 % (ref 0–1.8)
BASOPHILS # BLD: 0.04 K/UL (ref 0–0.12)
BILIRUB SERPL-MCNC: 0.3 MG/DL (ref 0.1–1.5)
BUN SERPL-MCNC: 13 MG/DL (ref 8–22)
CALCIUM SERPL-MCNC: 8.5 MG/DL (ref 8.5–10.5)
CHLORIDE SERPL-SCNC: 101 MMOL/L (ref 96–112)
CO2 SERPL-SCNC: 22 MMOL/L (ref 20–33)
CREAT SERPL-MCNC: 1.28 MG/DL (ref 0.5–1.4)
EOSINOPHIL # BLD AUTO: 0.5 K/UL (ref 0–0.51)
EOSINOPHIL NFR BLD: 7.7 % (ref 0–6.9)
ERYTHROCYTE [DISTWIDTH] IN BLOOD BY AUTOMATED COUNT: 41.6 FL (ref 35.9–50)
GLOBULIN SER CALC-MCNC: 3 G/DL (ref 1.9–3.5)
GLUCOSE BLD-MCNC: 145 MG/DL (ref 65–99)
GLUCOSE SERPL-MCNC: 151 MG/DL (ref 65–99)
HCT VFR BLD AUTO: 34 % (ref 42–52)
HGB BLD-MCNC: 11.2 G/DL (ref 14–18)
IMM GRANULOCYTES # BLD AUTO: 0.02 K/UL (ref 0–0.11)
IMM GRANULOCYTES NFR BLD AUTO: 0.3 % (ref 0–0.9)
LYMPHOCYTES # BLD AUTO: 2.2 K/UL (ref 1–4.8)
LYMPHOCYTES NFR BLD: 34 % (ref 22–41)
MAGNESIUM SERPL-MCNC: 1.3 MG/DL (ref 1.5–2.5)
MCH RBC QN AUTO: 29.4 PG (ref 27–33)
MCHC RBC AUTO-ENTMCNC: 32.9 G/DL (ref 33.7–35.3)
MCV RBC AUTO: 89.2 FL (ref 81.4–97.8)
MONOCYTES # BLD AUTO: 0.62 K/UL (ref 0–0.85)
MONOCYTES NFR BLD AUTO: 9.6 % (ref 0–13.4)
NEUTROPHILS # BLD AUTO: 3.1 K/UL (ref 1.82–7.42)
NEUTROPHILS NFR BLD: 47.8 % (ref 44–72)
NRBC # BLD AUTO: 0 K/UL
NRBC BLD-RTO: 0 /100 WBC
PHOSPHATE SERPL-MCNC: 1.8 MG/DL (ref 2.5–4.5)
PLATELET # BLD AUTO: 179 K/UL (ref 164–446)
PMV BLD AUTO: 10.3 FL (ref 9–12.9)
POTASSIUM SERPL-SCNC: 3.9 MMOL/L (ref 3.6–5.5)
PROT SERPL-MCNC: 6.5 G/DL (ref 6–8.2)
RBC # BLD AUTO: 3.81 M/UL (ref 4.7–6.1)
SODIUM SERPL-SCNC: 133 MMOL/L (ref 135–145)
WBC # BLD AUTO: 6.5 K/UL (ref 4.8–10.8)

## 2021-06-08 PROCEDURE — A9270 NON-COVERED ITEM OR SERVICE: HCPCS | Performed by: HOSPITALIST

## 2021-06-08 PROCEDURE — 700102 HCHG RX REV CODE 250 W/ 637 OVERRIDE(OP): Performed by: STUDENT IN AN ORGANIZED HEALTH CARE EDUCATION/TRAINING PROGRAM

## 2021-06-08 PROCEDURE — 99239 HOSP IP/OBS DSCHRG MGMT >30: CPT | Performed by: HOSPITALIST

## 2021-06-08 PROCEDURE — 700102 HCHG RX REV CODE 250 W/ 637 OVERRIDE(OP): Performed by: HOSPITALIST

## 2021-06-08 PROCEDURE — A9270 NON-COVERED ITEM OR SERVICE: HCPCS | Performed by: STUDENT IN AN ORGANIZED HEALTH CARE EDUCATION/TRAINING PROGRAM

## 2021-06-08 PROCEDURE — 700102 HCHG RX REV CODE 250 W/ 637 OVERRIDE(OP): Performed by: INTERNAL MEDICINE

## 2021-06-08 PROCEDURE — 84100 ASSAY OF PHOSPHORUS: CPT

## 2021-06-08 PROCEDURE — 83735 ASSAY OF MAGNESIUM: CPT

## 2021-06-08 PROCEDURE — 700111 HCHG RX REV CODE 636 W/ 250 OVERRIDE (IP): Performed by: STUDENT IN AN ORGANIZED HEALTH CARE EDUCATION/TRAINING PROGRAM

## 2021-06-08 PROCEDURE — 82962 GLUCOSE BLOOD TEST: CPT

## 2021-06-08 PROCEDURE — 80053 COMPREHEN METABOLIC PANEL: CPT

## 2021-06-08 PROCEDURE — 85025 COMPLETE CBC W/AUTO DIFF WBC: CPT

## 2021-06-08 PROCEDURE — 700111 HCHG RX REV CODE 636 W/ 250 OVERRIDE (IP): Performed by: HOSPITALIST

## 2021-06-08 PROCEDURE — A9270 NON-COVERED ITEM OR SERVICE: HCPCS | Performed by: INTERNAL MEDICINE

## 2021-06-08 RX ORDER — MAGNESIUM SULFATE HEPTAHYDRATE 40 MG/ML
2 INJECTION, SOLUTION INTRAVENOUS ONCE
Status: COMPLETED | OUTPATIENT
Start: 2021-06-08 | End: 2021-06-08

## 2021-06-08 RX ADMIN — METOPROLOL TARTRATE 25 MG: 25 TABLET, FILM COATED ORAL at 04:38

## 2021-06-08 RX ADMIN — AMLODIPINE BESYLATE 10 MG: 10 TABLET ORAL at 04:38

## 2021-06-08 RX ADMIN — MAGNESIUM SULFATE HEPTAHYDRATE 2 G: 40 INJECTION, SOLUTION INTRAVENOUS at 09:16

## 2021-06-08 RX ADMIN — DIBASIC SODIUM PHOSPHATE, MONOBASIC POTASSIUM PHOSPHATE AND MONOBASIC SODIUM PHOSPHATE 500 MG: 852; 155; 130 TABLET ORAL at 09:16

## 2021-06-08 RX ADMIN — OXYCODONE HYDROCHLORIDE 10 MG: 10 TABLET ORAL at 09:16

## 2021-06-08 RX ADMIN — BUPROPION HYDROCHLORIDE 75 MG: 75 TABLET, FILM COATED ORAL at 04:38

## 2021-06-08 RX ADMIN — HEPARIN SODIUM 5000 UNITS: 5000 INJECTION, SOLUTION INTRAVENOUS; SUBCUTANEOUS at 04:38

## 2021-06-08 RX ADMIN — POTASSIUM CHLORIDE 40 MEQ: 1500 TABLET, EXTENDED RELEASE ORAL at 04:37

## 2021-06-08 RX ADMIN — OXYCODONE HYDROCHLORIDE 10 MG: 10 TABLET ORAL at 05:14

## 2021-06-08 RX ADMIN — OMEPRAZOLE 20 MG: 20 CAPSULE, DELAYED RELEASE ORAL at 04:38

## 2021-06-08 RX ADMIN — FLUTICASONE PROPIONATE 100 MCG: 50 SPRAY, METERED NASAL at 05:15

## 2021-06-08 RX ADMIN — OXYCODONE HYDROCHLORIDE 10 MG: 10 TABLET ORAL at 01:15

## 2021-06-08 ASSESSMENT — FIBROSIS 4 INDEX: FIB4 SCORE: 1.57

## 2021-06-08 ASSESSMENT — PAIN DESCRIPTION - PAIN TYPE
TYPE: ACUTE PAIN

## 2021-06-08 NOTE — DISCHARGE INSTRUCTIONS
Discharge Instructions    Discharged to home by car with relative. Discharged via wheelchair, hospital escort: Yes.  Special equipment needed: Not Applicable    Be sure to schedule a follow-up appointment with your primary care doctor or any specialists as instructed.     Discharge Plan:   Diet Plan: Discussed  Activity Level: Discussed  Confirmed Follow up Appointment: Patient to Call and Schedule Appointment  Confirmed Symptoms Management: Discussed  Medication Reconciliation Updated: Yes    I understand that a diet low in cholesterol, fat, and sodium is recommended for good health. Unless I have been given specific instructions below for another diet, I accept this instruction as my diet prescription.   Other diet: diabetic    Special Instructions:     · Is patient discharged on Warfarin / Coumadin?   No     Depression / Suicide Risk    As you are discharged from this [facility] facility, it is important to learn how to keep safe from harming yourself.    Recognize the warning signs:  · Abrupt changes in personality, positive or negative- including increase in energy   · Giving away possessions  · Change in eating patterns- significant weight changes-  positive or negative  · Change in sleeping patterns- unable to sleep or sleeping all the time   · Unwillingness or inability to communicate  · Depression  · Unusual sadness, discouragement and loneliness  · Talk of wanting to die  · Neglect of personal appearance   · Rebelliousness- reckless behavior  · Withdrawal from people/activities they love  · Confusion- inability to concentrate     If you or a loved one observes any of these behaviors or has concerns about self-harm, here's what you can do:  · Talk about it- your feelings and reasons for harming yourself  · Remove any means that you might use to hurt yourself (examples: pills, rope, extension cords, firearm)  · Get professional help from the community (Mental Health, Substance Abuse, psychological  counseling)  · Do not be alone:Call your Safe Contact- someone whom you trust who will be there for you.  · Call your local CRISIS HOTLINE 415-9189 or 487-796-9594  · Call your local Children's Mobile Crisis Response Team [place] (041) 383-8870 or www.INMAN  · Call the toll free National Suicide Prevention Hotlines   · National Suicide Prevention Lifeline 053-410-QDJD (6120)  · National Hope Line Network 800-SUICIDE (758-1305)

## 2021-06-08 NOTE — DISCHARGE SUMMARY
Discharge Summary    CHIEF COMPLAINT ON ADMISSION  No chief complaint on file.      Reason for Admission  Septic shock     Admission Date  6/5/2021    CODE STATUS  Full Code    HPI & HOSPITAL COURSE  This is a 45 y.o. male here with dehydration, nausea vomiting, transferred from a  outlying facility of [place].  The patient reports GI upset, watery diarrhea for about a week, nausea vomiting leading to acute admission.  The patient has a history of a DUI which left him with weekly admissions to a local detention, he states on several occasions now he became ill after his incarceration.  The patient was admitted to the hospital here and treated aggressively with IV fluids, there was no evidence of acute infection, he does have a history of a reported gastric ulcer and complains of some epigastric pain.  His medication regimen otherwise was continued, the patient improved significantly.  He does overall have a poor control over his diabetes type 2, he also uses excessive amounts of cannabis products daily, and likely suffers from intermittent cannabis hyperemesis syndrome.  The patient was rehydrated, his electrolytes were placed, and patient feels improved, he is able to take nutrition well.  The patient has close follow-up with his primary care provider at [facility] within the next 1 to 2 days.  It was strongly suggested to the patient to discontinue his excessive cannabis use.  At this time the patient is improved, medically stabilized for discharge and further continuation of medical care in his home location.  The patient is instructed to continue to hydrate well, gradually advance his nutrition as tolerated.      Therefore, he is discharged in fair and stable condition to home with close outpatient follow-up.    The patient met 2-midnight criteria for an inpatient stay at the time of discharge.    Discharge Date  6/8/2021    FOLLOW UP ITEMS POST DISCHARGE  Further titration of his diabetic  regimen once the patient is back on a full diabetic diet    DISCHARGE DIAGNOSES  Principal Problem:    Epigastric pain POA: Unknown  Active Problems:    Hypomagnesemia POA: Unknown    Type 2 diabetes mellitus with diabetic neuropathy, without long-term current use of insulin ([facility]) POA: Yes    Essential hypertension POA: Unknown    Anxiety POA: Yes    SHARIF (acute kidney injury) ([facility]) POA: Yes  Resolved Problems:    Septic shock ([facility]) POA: Unknown  There was no evidence of admission of sepsis    FOLLOW UP  Future Appointments   Date Time Provider Department Center   6/9/2021  3:20 PM Juliana Melendez M.D. [facility] None   6/16/2021  3:30 PM Betsey Gonzalez M.D. [facility] None   6/18/2021 11:30 AM [facility] None   6/18/2021  1:40 PM Forrest Bella M.D. [facility] None     [place] [id number]  [phone]  Call  Please call to establish [facility] a Primary Care Provider as soon as possible. Thank You.      MEDICATIONS ON DISCHARGE     Medication List      CHANGE how you take these medications      Instructions   QUEtiapine 25 MG Tabs  What changed: when to take this  Commonly known as: SEROquel   Take 1 tablet by mouth 2 times a day.  Dose: 25 mg        CONTINUE taking these medications      Instructions   albuterol 108 (90 Base) MCG/ACT Aers inhalation aerosol   Doctor's comments: DX Code Needed  .  INHALE 2 PUFFS BY MOUTH EVERY 6 HOURS AS NEEDED FOR SHORTNESS OF BREATH  Dose: 2 Puff     Alogliptin Benzoate 25 MG Tabs   Take 25 mg by mouth every day.  Dose: 25 mg     amitriptyline 25 MG Tabs  Commonly known as: ELAVIL   Take 1 tablet by mouth at bedtime as needed.  Dose: 25 mg     atorvastatin 40 MG Tabs  Commonly known as: LIPITOR   Take 1 tablet by mouth every day.  Dose: 40 mg     fluticasone 50 MCG/ACT nasal spray  Commonly known as: FLONASE   Inhale 2 sprays into each nostril daily for allergies.     gabapentin 300 MG Caps  Commonly known as: NEURONTIN    TAKE 1 CAPSULE BY MOUTH TWICE A DAY     hydrOXYzine HCl 25 MG Tabs  Commonly known as: ATARAX   One or two bid prn anxiety     lisinopril 10 MG Tabs  Commonly known as: PRINIVIL   Take 1 tablet by mouth every day.  Dose: 10 mg     metformin 1000 MG tablet  Commonly known as: GLUCOPHAGE   Take 1 tablet by mouth 2 times a day with meals. TAKE 1 TABLET BY MOUTH TWICE A DAY  Dose: 1,000 mg     metoprolol tartrate 50 MG Tabs  Commonly known as: LOPRESSOR   Take 25 mg by mouth 2 times a day.  Dose: 25 mg     NON SPECIFIED   Take 1 capsule by mouth at bedtime. OTC sleep aid  Dose: 1 capsule     omeprazole 20 MG delayed-release capsule  Commonly known as: PRILOSEC   Take 1 capsule by mouth every day.  Dose: 20 mg     tamsulosin 0.4 MG capsule  Commonly known as: FLOMAX   Take 1 capsule by mouth 1/2 hour after breakfast.  Dose: 0.4 mg        STOP taking these medications    metoprolol  MG Tb24  Commonly known as: TOPROL XL            Allergies  No Known Allergies    DIET  Orders Placed This Encounter   Procedures   • Diet Order Diet: Consistent CHO (Diabetic)     Standing Status:   Standing     Number of Occurrences:   1     Order Specific Question:   Diet:     Answer:   Consistent CHO (Diabetic) [4]       ACTIVITY  As tolerated.  Weight bearing as tolerated    CONSULTATIONS  Intensivist    PROCEDURES  None    LABORATORY  Lab Results   Component Value Date    SODIUM 133 (L) 06/08/2021    POTASSIUM 3.9 06/08/2021    CHLORIDE 101 06/08/2021    CO2 22 06/08/2021    GLUCOSE 151 (H) 06/08/2021    BUN 13 06/08/2021    CREATININE 1.28 06/08/2021        Lab Results   Component Value Date    WBC 6.5 06/08/2021    HEMOGLOBIN 11.2 (L) 06/08/2021    HEMATOCRIT 34.0 (L) 06/08/2021    PLATELETCT 179 06/08/2021      The patient strongly advised to refrain from further use of alcohol and cannabis products  If his epigastric pain does not improve with over-the-counter remedies as well as acid blockade the patient refer to the next  available gastroenterologist for further evaluation and upper endoscopy if indicated.    Total time of the discharge process exceeds 55 minutes.

## 2021-06-08 NOTE — PROGRESS NOTES
AVS printed, explained, and given to patient. All belongings with patient. patient discharged via friend.

## 2021-06-08 NOTE — CARE PLAN
Problem: Knowledge Deficit - Standard  Goal: Patient and family/care givers will demonstrate understanding of plan of care, disease process/condition, diagnostic tests and medications  Outcome: Progressing     Problem: Pain - Standard  Goal: Alleviation of pain or a reduction in pain to the patient’s comfort goal  Outcome: Progressing   The patient is Stable - Low risk of patient condition declining or worsening    Shift Goals  Clinical Goals: Improved hydration and appetite  Patient Goals: pain control  Family Goals: n/a    Progress made toward(s) clinical / shift goals:  IV maintenance fluids, patient with improved appetite and adequate intake of food and fluids. Patient effectively uses 1-10 rating scale to identify and describe pain, patient educated regarding nonpharmacological methods to relieve pain, patient requests pain medication when needed      Patient is not progressing towards the following goals:

## 2021-06-09 ENCOUNTER — HOSPITAL ENCOUNTER (OUTPATIENT)
Dept: RADIOLOGY | Facility: MEDICAL CENTER | Age: 46
End: 2021-06-09
Payer: COMMERCIAL

## 2021-06-10 LAB
AMITRIP SERPL-MCNC: <10 NG/ML
AMITRIP+NOR SERPL-MCNC: ABNORMAL NG/ML (ref 95–250)
NORTRIP SERPL-MCNC: <10 NG/ML (ref 50–150)

## 2021-08-04 PROBLEM — F99 PSYCHIATRIC DISORDER: Status: RESOLVED | Noted: 2020-06-09 | Resolved: 2021-08-04

## 2022-01-12 PROBLEM — E16.2 HYPOGLYCEMIA: Status: RESOLVED | Noted: 2021-04-24 | Resolved: 2022-01-12

## 2022-01-12 PROBLEM — R60.0 LOWER EXTREMITY EDEMA: Status: RESOLVED | Noted: 2020-12-04 | Resolved: 2022-01-12

## 2022-01-12 PROBLEM — R00.0 TACHYCARDIA: Status: RESOLVED | Noted: 2017-02-06 | Resolved: 2022-01-12

## 2022-01-12 PROBLEM — N17.9 AKI (ACUTE KIDNEY INJURY) (HCC): Status: RESOLVED | Noted: 2021-04-24 | Resolved: 2022-01-12

## 2022-01-12 PROBLEM — F41.0 PANIC ATTACK: Status: RESOLVED | Noted: 2020-04-06 | Resolved: 2022-01-12

## 2022-01-12 PROBLEM — R73.09 HEMOGLOBIN A1C GREATER THAN 9.0%: Status: ACTIVE | Noted: 2022-01-12

## 2022-06-02 PROBLEM — F43.22 ADJUSTMENT DISORDER WITH ANXIETY: Status: ACTIVE | Noted: 2022-06-02

## 2022-06-02 PROBLEM — F41.9 ANXIETY: Status: RESOLVED | Noted: 2020-07-10 | Resolved: 2022-06-02

## 2022-06-02 PROBLEM — F10.21 ALCOHOL USE DISORDER, SEVERE, IN SUSTAINED REMISSION (HCC): Status: ACTIVE | Noted: 2022-06-02

## 2022-06-02 PROBLEM — F10.11 ALCOHOL ABUSE, IN REMISSION: Status: RESOLVED | Noted: 2017-04-03 | Resolved: 2022-06-02

## 2022-06-02 PROBLEM — F43.9 TRAUMA AND STRESSOR-RELATED DISORDER: Status: ACTIVE | Noted: 2020-05-13

## 2022-06-02 PROBLEM — F41.1 GENERALIZED ANXIETY DISORDER: Status: ACTIVE | Noted: 2022-06-02

## 2022-06-02 PROBLEM — F12.20 CANNABIS USE DISORDER, SEVERE, DEPENDENCE (HCC): Status: ACTIVE | Noted: 2022-06-02

## 2022-09-08 PROBLEM — E53.8: Status: ACTIVE | Noted: 2022-09-08

## 2022-09-08 PROBLEM — G32.0: Status: ACTIVE | Noted: 2022-09-08

## 2022-09-09 PROBLEM — G62.2: Status: ACTIVE | Noted: 2022-09-09

## 2022-09-09 PROBLEM — G61.9: Status: ACTIVE | Noted: 2022-09-09

## 2022-09-10 PROBLEM — E83.42 HYPOMAGNESEMIA: Status: RESOLVED | Noted: 2017-02-06 | Resolved: 2022-09-10

## 2022-09-28 PROBLEM — M86.9 OSTEOMYELITIS (HCC): Status: ACTIVE | Noted: 2020-08-15

## 2022-10-03 PROBLEM — L03.116 CELLULITIS OF LEFT LOWER EXTREMITY: Status: ACTIVE | Noted: 2022-10-03

## 2022-10-04 PROBLEM — M54.12 CERVICAL RADICULOPATHY: Status: ACTIVE | Noted: 2022-10-04

## 2022-10-04 PROBLEM — G95.9 CERVICAL MYELOPATHY (HCC): Status: ACTIVE | Noted: 2022-10-04

## 2022-10-25 PROBLEM — E11.9 DIABETES (HCC): Status: ACTIVE | Noted: 2019-01-17

## 2022-11-01 PROBLEM — F43.10 PTSD (POST-TRAUMATIC STRESS DISORDER): Status: ACTIVE | Noted: 2020-05-13

## 2022-11-01 PROBLEM — F12.90 CANNABIS USE DISORDER: Status: ACTIVE | Noted: 2022-11-01

## 2022-11-01 PROBLEM — T41.0X5A: Status: ACTIVE | Noted: 2022-11-01

## 2022-11-01 PROBLEM — F41.0 PANIC DISORDER WITHOUT AGORAPHOBIA: Status: ACTIVE | Noted: 2022-11-01
